# Patient Record
Sex: FEMALE | Race: WHITE | NOT HISPANIC OR LATINO | URBAN - METROPOLITAN AREA
[De-identification: names, ages, dates, MRNs, and addresses within clinical notes are randomized per-mention and may not be internally consistent; named-entity substitution may affect disease eponyms.]

---

## 2017-01-31 DIAGNOSIS — J43.2 CENTRILOBULAR EMPHYSEMA (H): Primary | ICD-10-CM

## 2017-07-19 ENCOUNTER — OFFICE VISIT (OUTPATIENT)
Dept: SLEEP MEDICINE | Facility: CLINIC | Age: 52
End: 2017-07-19
Attending: INTERNAL MEDICINE
Payer: COMMERCIAL

## 2017-07-19 VITALS
HEART RATE: 88 BPM | BODY MASS INDEX: 31.98 KG/M2 | SYSTOLIC BLOOD PRESSURE: 141 MMHG | RESPIRATION RATE: 16 BRPM | WEIGHT: 199 LBS | DIASTOLIC BLOOD PRESSURE: 83 MMHG | HEIGHT: 66 IN | OXYGEN SATURATION: 97 %

## 2017-07-19 DIAGNOSIS — G47.33 OSA (OBSTRUCTIVE SLEEP APNEA): ICD-10-CM

## 2017-07-19 PROCEDURE — 99211 OFF/OP EST MAY X REQ PHY/QHP: CPT | Mod: ZF

## 2017-07-19 NOTE — PATIENT INSTRUCTIONS
Please get us a copy of sleep study for our records.    1.  Continue CPAP every night for all hours that you are sleeping.  If you nap use CPAP.  As always, try to get at least 8 hours of sleep or more each day, and avoid sleep deprivation. Avoid alcohol.    2.  Reasons that you might need a change to your pressure therapy would be weight gain or loss, waking having inadvertently removed your CPAP overnight, having previously felt refreshed by sleep with CPAP use and now waking un-refreshed, and return of daytime sleepiness. Also, the development of new medical problems can sometimes affect breathing at night-heart failure, stroke, medications such as narcotics.    3.  Please bring CPAP with you if you are hospitalized.  If anticipating surgery be sure to discuss with your surgeon that you have sleep apnea and use PAP therapy.      4.  Maintain your equipment as recommended which includes routine cleaning and replacement of supplies.  Call DME for any questions regarding supplies or maintenance.      5.  Do not drive on engage in potentially dangerous activities if feeling sleepy.    6.  Please see Tia Vallejo NP again in 24 months and bring your machine and card to your follow-up visit.        Tips for your CPAP and BIPAP use-    Mask fitting tips  Mask fitting exercise:    To improve your mask seal and your mobility at night, put mask on and secure in place.  Lie down in bed with full pressure and roll to one side, adjust headgear while in that position to eliminate any leaks. Repeat process rolling to other side.     The mask seal does not have to be perfect:   CPAP machines are designed to make up for small leaks. However, you will not tolerate leaks blowing in your eyes so you will need to adjust.   Any leak should only be near or at the bottom of the mask.  We expect your mask to leak slightly at night.    Do not over-tighten the headgear straps, tighter IS NOT better, we expect minimal leak.    First try  re-positioning the mask or headgear before tightening the headgear straps.  Mask leaks are expected due to changing sleeping positions. Try pulling the mask away from your skin allowing the cushion to re-inflate will minimize the leak.  If you struggle for a good fit, try turning the CPAP off and then readjust the mask by pulling it away from your face and then turning back on the CPAP.        Humidifier tips  Humidifiers can be adjusted to increase or decrease the amount of moisture according to your comfort level. You may need to adjust this frequently at first, but then might only change it with seasonal weather changes.     Try INCREASING the humidity if:  You experience a dry, irritated nasal passage or throat.  You have a runny, drippy nose or sneezing fits after using CPAP.  You experience nasal congestion during or after CPAP use.    Try DECREASING the humidity if:  You have excessive condensation or  rain out  in the tubing or mask.  Otherwise keep the tubing warm during the night by running it underneath the blankets or pillow.      Clinic visit after initial CPAP and BIPAP set-up   Bring your equipment with you to your 4 week follow up clinic visit.  We will be extracting your data from the machine.        Travel  Always take your equipment with you.  If you fly with your equipment bring it on with you as a carry on.  Medical equipment does not count as a carry on.    If you travel international the machines take 110-240.  The only adapter needed is the adapter that will fit into the receptacle (outlet).    You may also want to bring an extension cord as many hotel rooms have limited outlets at the bedside.  Do not travel with water in your humidifier chamber.     Cleaning and Maintenance Guidelines    Equipment Frequency Cleaning Method   Mask First Day    Daily      Weekly Soak mask in hot soapy water for 30 minutes, rinse and air dry.  Wipe nasal cushion with a hot soapy (Ivory, baby shampoo) cloth and  rinse.  Baby wipes may also be used.  Do not use anti-bacterial soaps,Delma  liquid soap, rubbing alcohol, bleach or ammonia.  Wash frame in hot soapy water (Ivory, baby shampoo) rinse and let air dry   Headgear Biweekly Wash in hot soapy water, rinse and air dry   Reusable Gray Filter Weekly Wash in hot soapy water, rinse, put in towel squeeze moisture out, let air dry   Disposable White Filter Check Weekly Replace when brown or gray in color; at least every 2 to 3 months   Humidifier Chamber Daily    Weekly Empty distilled water from humidifier and let air dry    Hand wash in hot soapy water, rinse and air dry   Tubing Weekly Wash in hot soapy water, rinse and let air dry   Mask, Tubing and Humidifier Chamber As needed Disinfect: Soak in 1 part vinegar to 3 parts hot water for 30 minutes, rinse well and air dry  Not the material headgear        MASK AND SUPPLY REORDERING  Reminder: Most insurance companies will allow for a new mask, headgear, tubing, and reusable gray filter every six months.  Disposable white ultra-fine filters are covered monthly.    EQUIPMENT NEEDS  Please call our CPAP specialist with any equipment problems, questions or needs.    HOME AND SAFETY INSTRUCTIONS    Do not use frayed or cracked electrical cords, multi plug adaptors, or switched receptacles    Do not immerse electrical equipment into water    Assure that electrical cords do not become a tripping hazard      Your BMI is Body mass index is 32.12 kg/(m^2).  Weight management is a personal decision.  If you are interested in exploring weight loss strategies, the following discussion covers the approaches that may be successful. Body mass index (BMI) is one way to tell whether you are at a healthy weight, overweight, or obese. It measures your weight in relation to your height.  A BMI of 18.5 to 24.9 is in the healthy range. A person with a BMI of 25 to 29.9 is considered overweight, and someone with a BMI of 30 or greater is considered  obese. More than two-thirds of American adults are considered overweight or obese.  Being overweight or obese increases the risk for further weight gain. Excess weight may lead to heart disease and diabetes.  Creating and following plans for healthy eating and physical activity may help you improve your health.  Weight control is part of healthy lifestyle and includes exercise, emotional health, and healthy eating habits. Careful eating habits lifelong are the mainstay of weight control. Though there are significant health benefits from weight loss, long-term weight loss with diet alone may be very difficult to achieve- studies show long-term success with dietary management in less than 10% of people. Attaining a healthy weight may be especially difficult to achieve in those with severe obesity. In some cases, medications, devices and surgical management might be considered.  What can you do?  If you are overweight or obese and are interested in methods for weight loss, you should discuss this with your provider.     Consider reducing daily calorie intake by 500 calories.     Keep a food journal.     Avoiding skipping meals, consider cutting portions instead.    Diet combined with exercise helps maintain muscle while optimizing fat loss. Strength training is particularly important for building and maintaining muscle mass. Exercise helps reduce stress, increase energy, and improves fitness. Increasing exercise without diet control, however, may not burn enough calories to loose weight.       Start walking three days a week 10-20 minutes at a time    Work towards walking thirty minutes five days a week     Eventually, increase the speed of your walking for 1-2 minutes at time    In addition, we recommend that you review healthy lifestyles and methods for weight loss available through the National Institutes of Health patient information sites:  http://win.niddk.nih.gov/publications/index.htm    And look into health  and wellness programs that may be available through your health insurance provider, employer, local community center, or [employer].    Weight management plan: Patient was referred to their PCP to discuss a diet and exercise plan.     Your blood pressure was checked while you were in clinic today.  Please read the guidelines below about what these numbers mean and what you should do about them.  Your systolic blood pressure is the top number.  This is the pressure when the heart is pumping.  Your diastolic blood pressure is the bottom number.  This is the pressure in between beats.  If your systolic blood pressure is less than 120 and your diastolic blood pressure is less than 80, then your blood pressure is normal. There is nothing more that you need to do about it  If your systolic blood pressure is 120-139 or your diastolic blood pressure is 80-89, your blood pressure may be higher than it should be.  You should have your blood pressure re-checked within a year by a primary care provider.  If your systolic blood pressure is 140 or greater or your diastolic blood pressure is 90 or greater, you may have high blood pressure.  High blood pressure is treatable, but if left untreated over time it can put you at risk for heart attack, stroke, or kidney failure.  You should have your blood pressure re-checked by a primary care provider within the next four weeks.

## 2017-07-19 NOTE — PROGRESS NOTES
Sleep Follow-Up Visit:    Date on this visit: 7/19/2017    Rosey Davalos is a 51 y/o female with past medical history including COPD, recent diagnosis of DM II, ERNIE on CPAP, GERD, obesity.  She comes in today for follow-up because she wanted to go over her CPAP download and check in regarding her ERNIE. We did not have a copy of her PSG at the time of interview, and requested that she provide a copy of her PSG for our records.  Ms. Davalos reported that she uses her CPAP regularly and overall has no sleep complaints.  She reported  that she sleeps well, and has no difficulty falling or staying asleep.  She does not notice snoring or gasping for air while using her CPAP.  She reported that she does take melatonin 2mg po nightly.  Ms. Davalos reported that she has had an approximately 50 lb weight gain over the past few years.  She denied being aware of the need to move her legs at night, but said her  has told her she moves her legs in her sleep at night.  She said she is followed by a psychiatrist and treated for ADHD with Adderall; PTSD with Venlefaxine and xanax (taking this twice/month).  She indicated she has obtains her CPAP supplies through [employer], but was not sure who had been ordering her supplies.  We discussed the need for our clinic to have a copy of her PSG if we were to write orders for her CPAP or supplies.    PAP download was reviewed:  Used the device 30/30days  More than 4 hours of use: 100%  Average daily use on the days used was 8.8 hours  Pressure 13.6cm H2O (95th percentile)  Residual AHI 2.5  Leak 10.0 L/min (95th percentile)    PSG from 8/2013 received:  Her weight at the time of the study was 182 lbs.  It appeared to be a good quality study where she slept 404.5 minutes and all stages of sleep were represented.  AHI 9.2, RERA index 25.7, RDI 34.9, PLM  with arousal: 0, PLM without arousal 203.    Problem List:  Patient Active Problem List    Diagnosis Date Noted     Obesity 07/09/2014  "    Priority: Medium     Menorrhagia 11/20/2013     Priority: Medium     History of tubal ligation 11/20/2013     Priority: Medium     History of cholecystectomy 11/20/2013     Priority: Medium     Iron deficiency 11/20/2013     Priority: Medium     ERNIE (obstructive sleep apnea) 11/20/2013     Priority: Medium     Auto CPAP since 08/2013  Moderate to severe  Controlled with CPAP - followed by sleep  Nasal mask       Obstructive lung disease (H) 11/20/2013     Priority: Medium     Moderate obstruction       Esophageal reflux 11/20/2013     Priority: Medium     Vitamin D deficiency 11/20/2013     Priority: Medium     Chronic fatigue 11/20/2013     Priority: Medium     Likely multi-factorial       Depression 11/20/2013     Priority: Medium     Pulmonary nodules 11/20/2013     Priority: Medium     Several small sub-4 mm nodules scattered throughout the lungs,  representative lesions include  2 mm right lower lobe  2 mm calcified right upper lobe  2 mm calcified left upper lobe     CT needed 11/2014            Eppworth score:  2    Physical Exam:  /83  Pulse 88  Resp 16  Ht 1.676 m (5' 6\")  Wt 90.3 kg (199 lb)  SpO2 97%  BMI 32.12 kg/m2    General: No apparent distress, appropriately groomed, calm  Head: Normocephalic, atraumatic, Eyes: no icterus, PERRL  Nose: nares patent, normal turbinates.  Mouth: Mallampati I  Cardiac: Regular rate and rhythm  Chest: Symmetric air movement, lungs clear to auscultation bilaterally, no coughing noted during interview  Musculoskeletal: no edema noted  Skin: Warm, dry, intact  Psych: Mood pleasant, affect congruent, alert, did not appear to be tired.      Impression/Plan:    Obstructive Sleep Apnea, (Mild by AHI on 2013 PSG but moderate considering RDI of 34.9) well controlled on CPAP.    Periodic Limb movements during sleep    Today's CPAP machine data shows 100% compliance. ERNIE is well controlled with a residual AHI 2.5 events per hour. No need to make any pressures " adjustments at this time. Recommend to continue good compliance. Continue to get the equipment replaced on a regular basis and maintain machine as recommended.      Periodic limb movements:  Her PSG from 2013 demonstrated significant periodic limb movements without arousal.  On interview she reported that her  noted limb movements, but she has no awareness of concern about her leg movements.  Would recommend continuing to monitor this in case it becomes problematic.    PSG from 2013 received and will be scanned into chart.    Encourage weight loss, healthy diet, and exercise.    Patient was strongly advised to avoid driving while drowsy or sleepy.  Patient was counseled on the importance of driving while alert, to pull over if drowsy, or nap before getting into the vehicle if sleepy.      Follow up Tia Jeffery NP in 2 years and prn  ?  Seen and examined with Dr. Lee  ?    Tay Wells MD  Clinical Sleep Medicine Fellow  Pager #[phone]      Attending: Patient seen and examined and personally counseled.  Todays CPAP download reviewed, PSG results personally reviewed from 2013. This note reflects our mutual assessment and plan.  Mona Farmer MD  Pulmonary, Critical Care, and Sleep Medicine

## 2017-07-19 NOTE — NURSING NOTE
"Chief Complaint   Patient presents with     CPAP Follow Up       Initial /83  Pulse 88  Resp 16  Ht 1.676 m (5' 6\")  Wt 90.3 kg (199 lb)  SpO2 97%  BMI 32.12 kg/m2 Estimated body mass index is 32.12 kg/(m^2) as calculated from the following:    Height as of this encounter: 1.676 m (5' 6\").    Weight as of this encounter: 90.3 kg (199 lb).  Medication Reconciliation: complete     GASTON Pelayo        "

## 2017-07-19 NOTE — MR AVS SNAPSHOT
After Visit Summary   7/19/2017    Rosey Davalos    MRN: [id number]           Patient Information     Date Of Birth          1965        Visit Information        Provider Department      7/19/2017 9:20 AM Tay Wells MD [facility], [place], Sleep Study        Today's Diagnoses     ERNIE (obstructive sleep apnea)          Care Instructions    Please get us a copy of sleep study for our records.    1.  Continue CPAP every night for all hours that you are sleeping.  If you nap use CPAP.  As always, try to get at least 8 hours of sleep or more each day, and avoid sleep deprivation. Avoid alcohol.    2.  Reasons that you might need a change to your pressure therapy would be weight gain or loss, waking having inadvertently removed your CPAP overnight, having previously felt refreshed by sleep with CPAP use and now waking un-refreshed, and return of daytime sleepiness. Also, the development of new medical problems can sometimes affect breathing at night-heart failure, stroke, medications such as narcotics.    3.  Please bring CPAP with you if you are hospitalized.  If anticipating surgery be sure to discuss with your surgeon that you have sleep apnea and use PAP therapy.      4.  Maintain your equipment as recommended which includes routine cleaning and replacement of supplies.  Call DME for any questions regarding supplies or maintenance.      5.  Do not drive on engage in potentially dangerous activities if feeling sleepy.    6.  Please see Tia Vallejo NP again in 24 months and bring your machine and card to your follow-up visit.        Tips for your CPAP and BIPAP use-    Mask fitting tips  Mask fitting exercise:    To improve your mask seal and your mobility at night, put mask on and secure in place.  Lie down in bed with full pressure and roll to one side, adjust headgear while in that position to eliminate any leaks. Repeat process rolling to other side.     The mask seal does not have to be  perfect:   CPAP machines are designed to make up for small leaks. However, you will not tolerate leaks blowing in your eyes so you will need to adjust.   Any leak should only be near or at the bottom of the mask.  We expect your mask to leak slightly at night.    Do not over-tighten the headgear straps, tighter IS NOT better, we expect minimal leak.    First try re-positioning the mask or headgear before tightening the headgear straps.  Mask leaks are expected due to changing sleeping positions. Try pulling the mask away from your skin allowing the cushion to re-inflate will minimize the leak.  If you struggle for a good fit, try turning the CPAP off and then readjust the mask by pulling it away from your face and then turning back on the CPAP.        Humidifier tips  Humidifiers can be adjusted to increase or decrease the amount of moisture according to your comfort level. You may need to adjust this frequently at first, but then might only change it with seasonal weather changes.     Try INCREASING the humidity if:  You experience a dry, irritated nasal passage or throat.  You have a runny, drippy nose or sneezing fits after using CPAP.  You experience nasal congestion during or after CPAP use.    Try DECREASING the humidity if:  You have excessive condensation or  rain out  in the tubing or mask.  Otherwise keep the tubing warm during the night by running it underneath the blankets or pillow.      Clinic visit after initial CPAP and BIPAP set-up   Bring your equipment with you to your 4 week follow up clinic visit.  We will be extracting your data from the machine.        Travel  Always take your equipment with you.  If you fly with your equipment bring it on with you as a carry on.  Medical equipment does not count as a carry on.    If you travel international the machines take 110-240.  The only adapter needed is the adapter that will fit into the receptacle (outlet).    You may also want to bring an extension  cord as many hotel rooms have limited outlets at the bedside.  Do not travel with water in your humidifier chamber.     Cleaning and Maintenance Guidelines    Equipment Frequency Cleaning Method   Mask First Day    Daily      Weekly Soak mask in hot soapy water for 30 minutes, rinse and air dry.  Wipe nasal cushion with a hot soapy (Ivory, baby shampoo) cloth and rinse.  Baby wipes may also be used.  Do not use anti-bacterial soaps,Delma  liquid soap, rubbing alcohol, bleach or ammonia.  Wash frame in hot soapy water (Ivory, baby shampoo) rinse and let air dry   Headgear Biweekly Wash in hot soapy water, rinse and air dry   Reusable Gray Filter Weekly Wash in hot soapy water, rinse, put in towel squeeze moisture out, let air dry   Disposable White Filter Check Weekly Replace when brown or gray in color; at least every 2 to 3 months   Humidifier Chamber Daily    Weekly Empty distilled water from humidifier and let air dry    Hand wash in hot soapy water, rinse and air dry   Tubing Weekly Wash in hot soapy water, rinse and let air dry   Mask, Tubing and Humidifier Chamber As needed Disinfect: Soak in 1 part vinegar to 3 parts hot water for 30 minutes, rinse well and air dry  Not the material headgear        MASK AND SUPPLY REORDERING  Reminder: Most insurance companies will allow for a new mask, headgear, tubing, and reusable gray filter every six months.  Disposable white ultra-fine filters are covered monthly.    EQUIPMENT NEEDS  Please call our CPAP specialist with any equipment problems, questions or needs.    HOME AND SAFETY INSTRUCTIONS    Do not use frayed or cracked electrical cords, multi plug adaptors, or switched receptacles    Do not immerse electrical equipment into water    Assure that electrical cords do not become a tripping hazard      Your BMI is Body mass index is 32.12 kg/(m^2).  Weight management is a personal decision.  If you are interested in exploring weight loss strategies, the following  discussion covers the approaches that may be successful. Body mass index (BMI) is one way to tell whether you are at a healthy weight, overweight, or obese. It measures your weight in relation to your height.  A BMI of 18.5 to 24.9 is in the healthy range. A person with a BMI of 25 to 29.9 is considered overweight, and someone with a BMI of 30 or greater is considered obese. More than two-thirds of American adults are considered overweight or obese.  Being overweight or obese increases the risk for further weight gain. Excess weight may lead to heart disease and diabetes.  Creating and following plans for healthy eating and physical activity may help you improve your health.  Weight control is part of healthy lifestyle and includes exercise, emotional health, and healthy eating habits. Careful eating habits lifelong are the mainstay of weight control. Though there are significant health benefits from weight loss, long-term weight loss with diet alone may be very difficult to achieve- studies show long-term success with dietary management in less than 10% of people. Attaining a healthy weight may be especially difficult to achieve in those with severe obesity. In some cases, medications, devices and surgical management might be considered.  What can you do?  If you are overweight or obese and are interested in methods for weight loss, you should discuss this with your provider.     Consider reducing daily calorie intake by 500 calories.     Keep a food journal.     Avoiding skipping meals, consider cutting portions instead.    Diet combined with exercise helps maintain muscle while optimizing fat loss. Strength training is particularly important for building and maintaining muscle mass. Exercise helps reduce stress, increase energy, and improves fitness. Increasing exercise without diet control, however, may not burn enough calories to loose weight.       Start walking three days a week 10-20 minutes at a  time    Work towards walking thirty minutes five days a week     Eventually, increase the speed of your walking for 1-2 minutes at time    In addition, we recommend that you review healthy lifestyles and methods for weight loss available through the National Institutes of Health patient information sites:  http://win.niddk.nih.gov/publications/index.htm    And look into health and wellness programs that may be available through your health insurance provider, employer, local community center, or [employer] club.    Weight management plan: Patient was referred to their PCP to discuss a diet and exercise plan.     Your blood pressure was checked while you were in clinic today.  Please read the guidelines below about what these numbers mean and what you should do about them.  Your systolic blood pressure is the top number.  This is the pressure when the heart is pumping.  Your diastolic blood pressure is the bottom number.  This is the pressure in between beats.  If your systolic blood pressure is less than 120 and your diastolic blood pressure is less than 80, then your blood pressure is normal. There is nothing more that you need to do about it  If your systolic blood pressure is 120-139 or your diastolic blood pressure is 80-89, your blood pressure may be higher than it should be.  You should have your blood pressure re-checked within a year by a primary care provider.  If your systolic blood pressure is 140 or greater or your diastolic blood pressure is 90 or greater, you may have high blood pressure.  High blood pressure is treatable, but if left untreated over time it can put you at risk for heart attack, stroke, or kidney failure.  You should have your blood pressure re-checked by a primary care provider within the next four weeks.                Follow-ups after your visit        Follow-up notes from your care team     Return in about 2 years (around 7/19/2019).      Your next 10 appointments already scheduled      "Oct 16, 2017  9:30 AM CDT   PFT VISIT with  PFL B   [facility] ([facility])    [address] [address] [phone]            Oct 16, 2017 10:00 AM CDT   (Arrive by 9:45 AM)   Return Visit with Evelyn Lazo MD   [facility] ([facility]    [address] [address] [phone]              Who to contact     If you have questions or need follow up information about today's clinic visit or your schedule please contact [facility] directly at [phone].  Normal or non-critical lab and imaging results will be communicated to you by MyChart, letter or phone within 4 business days after the clinic has received the results. If you do not hear from us within 7 days, please contact the clinic through MyChart or phone. If you have a critical or abnormal lab result, we will notify you by phone as soon as possible.  Submit refill requests through [employer] or call your pharmacy and they will forward the refill request to us. Please allow 3 business days for your refill to be completed.          Additional Information About Your Visit        [employer] Information     [employer] gives you secure access to your electronic health record. If you see a primary care provider, you can also send messages to your care team and make appointments. If you have questions, please call your primary care clinic.  If you do not have a primary care provider, please call [phone] and they will assist you.        Care EveryWhere ID     This is your Care EveryWhere ID. This could be used by other organizations to access your [facility] medical records  [id number]        Your Vitals Were     Pulse Respirations Height Pulse Oximetry BMI (Body Mass Index)       88 16 1.676 m (5' 6\") 97% 32.12 kg/m2        Blood Pressure from Last 3 " Encounters:   07/19/17 141/83   05/25/16 131/88   01/27/16 133/89    Weight from Last 3 Encounters:   07/19/17 90.3 kg (199 lb)   01/27/16 96.6 kg (213 lb)   05/27/15 79 kg (174 lb 1.6 oz)              Today, you had the following     No orders found for display       Primary Care Provider Office Phone # Fax #    [facility] [phone]       [address] [address]        Equal Access to Services     [facility]: Hadii aad ku hadasho Soomaali, waaxda luqadaha, qaybta kaalmada adeegyada, candace sheikh . So [facility] [phone].    ATENCIÓN: Si habla español, tiene a brandt disposición servicios gratuitos de asistencia lingüística. [facility] [phone].    We comply with applicable federal civil rights laws and [place] laws. We do not discriminate on the basis of race, color, national origin, age, disability sex, sexual orientation or gender identity.            Thank you!     Thank you for choosing [facility]  for your care. Our goal is always to provide you with excellent care. Hearing back from our patients is one way we can continue to improve our services. Please take a few minutes to complete the written survey that you may receive in the mail after your visit with us. Thank you!             Your Updated Medication List - Protect others around you: Learn how to safely use, store and throw away your medicines at www.disposemymeds.org.          This list is accurate as of: 7/19/17  2:24 PM.  Always use your most recent med list.                   Brand Name Dispense Instructions for use Diagnosis    * ADDERALL XR PO      Take 20 mg by mouth        * ADDERALL PO      Take 10 mg by mouth        EFFEXOR PO      Take 150 mg by mouth 3 times daily        Fiber Tabs      Take 2 tablets by mouth daily        fluticasone-salmeterol 250-50 MCG/DOSE diskus inhaler    ADVAIR    1 Inhaler    Inhale 1 puff into the lungs 2 times  daily    Centrilobular emphysema (H)       Iron Tabs      Take 1 tablet by mouth daily        OMEPRAZOLE PO      Take 1 tablet by mouth daily 1 20mg tablet once daily        order for DME      Use your CPAP device as directed by your provider.        UNABLE TO FIND      10 mg MEDICATION NAME: T3 for thyroid        VALTREX 500 MG tablet   Generic drug:  valACYclovir      Take 500 mg by mouth 2 times daily        VENTOLIN IN      Inhale 2 puffs into the lungs 2 times daily        vitamin D3 60549 UNITS capsule    CHOLECALCIFEROL     Take 50,000 Units by mouth every 7 days        XANAX 0.5 MG tablet   Generic drug:  ALPRAZolam      Take 0.5 mg by mouth 2 times daily as needed        ZYRTEC PO      Take 1 tablet by mouth daily        * Notice:  This list has 2 medication(s) that are the same as other medications prescribed for you. Read the directions carefully, and ask your doctor or other care provider to review them with you.

## 2017-08-17 ENCOUNTER — PRE VISIT (OUTPATIENT)
Dept: ORTHOPEDICS | Facility: CLINIC | Age: 52
End: 2017-08-17

## 2017-08-17 NOTE — TELEPHONE ENCOUNTER
1.  Date/reason for appt: 8/30/17 - Rt Foot Pain  2.  Referring provider: self  3.  Call to patient (Yes / No - short description): no, per appt notes -- no outside records or imaging per pt  4.  Previous care at / records requested from: NONE

## 2017-08-30 ENCOUNTER — OFFICE VISIT (OUTPATIENT)
Dept: ORTHOPEDICS | Facility: CLINIC | Age: 52
End: 2017-08-30

## 2017-08-30 VITALS — BODY MASS INDEX: 33.3 KG/M2 | HEIGHT: 66 IN | WEIGHT: 207.2 LBS

## 2017-08-30 DIAGNOSIS — M72.2 PLANTAR FASCIITIS: Primary | ICD-10-CM

## 2017-08-30 DIAGNOSIS — I87.2 VENOUS (PERIPHERAL) INSUFFICIENCY: ICD-10-CM

## 2017-08-30 DIAGNOSIS — M79.671 PAIN OF RIGHT HEEL: ICD-10-CM

## 2017-08-30 RX ORDER — GLIPIZIDE AND METFORMIN HCL 2.5; 5 MG/1; MG/1
1 TABLET, FILM COATED ORAL
COMMUNITY
End: 2020-06-12

## 2017-08-30 ASSESSMENT — ENCOUNTER SYMPTOMS
BLOOD IN STOOL: 0
EXERCISE INTOLERANCE: 0
DIFFICULTY URINATING: 0
JAUNDICE: 0
HYPERTENSION: 0
CLAUDICATION: 0
ARTHRALGIAS: 1
MYALGIAS: 0
HEARTBURN: 0
BACK PAIN: 0
SLEEP DISTURBANCES DUE TO BREATHING: 0
STIFFNESS: 1
DYSURIA: 0
HEMATURIA: 0
ORTHOPNEA: 0
LIGHT-HEADEDNESS: 0
ABDOMINAL PAIN: 0
FLANK PAIN: 0
HYPOTENSION: 0
LEG SWELLING: 1
PALPITATIONS: 0
NAUSEA: 0
VOMITING: 0
SYNCOPE: 0
TACHYCARDIA: 0
BOWEL INCONTINENCE: 0
CONSTIPATION: 1
DIARRHEA: 0
BLOATING: 0
RECTAL PAIN: 0
RECTAL BLEEDING: 0
LEG PAIN: 0
JOINT SWELLING: 0
MUSCLE CRAMPS: 0
NECK PAIN: 0
MUSCLE WEAKNESS: 0

## 2017-08-30 NOTE — PROGRESS NOTES
Date of Service: 8/30/2017    Chief Complaint:   Chief Complaint   Patient presents with     Consult     Right foot pain, bottom of her foot arch to her heel, for a few months, wakes her up at night, can't go to sleep some nights      HPI: Rosey is a 52 year old female who presents today for evaluation of right foot arch and heel pain. This sharp, stabbing pain has been present for about 2-3 months without a known precipitating event. It is located on the inside of her right heel. It has gotten steadily worse to the point where she is now having difficulty falling asleep, staying asleep and will limp in the mornings. Some days are worse than others and she can't think of why. Does not have a daily exercise routine, but it does impact her daily activities (routine walking and tasks). She has tried frozen water bottle and stretches with minimal relief. Is hesitant to use NSAIDs for pain because at one time her PCP told her to avoid them if possible. She was recently diagnosed with DM type 2 which is controlled with metformin. Had left bunion repair in the past that was successful. Admits intermittent pitting edema. Denies foot pain elsewhere, burning, tingling, numbness, skin lesions, bruising, rashes.    Review of Systems: Answers for HPI/ROS submitted by the patient on 8/30/2017   General Symptoms: No  Skin Symptoms: No  HENT Symptoms: No  EYE SYMPTOMS: No  HEART SYMPTOMS: Yes  LUNG SYMPTOMS: No  INTESTINAL SYMPTOMS: Yes  URINARY SYMPTOMS: Yes  GYNECOLOGIC SYMPTOMS: No  BREAST SYMPTOMS: No  SKELETAL SYMPTOMS: Yes  BLOOD SYMPTOMS: No  NERVOUS SYSTEM SYMPTOMS: No  MENTAL HEALTH SYMPTOMS: No  Chest pain or pressure: No  Fast or irregular heartbeat: No  Pain in legs with walking: No  Swelling in feet or ankles: Yes  Trouble breathing while lying down: No  Fingers or Toes appear blue: No  High blood pressure: No  Low blood pressure: No  Fainting: No  Murmurs: No  Chest pain on exertion: No  Chest pain at rest:  No  Cramping pain in leg during exercise: No  Pacemaker: No  Varicose veins: No  Edema or swelling: Yes  Fast heart beat: No  Wake up at night with shortness of breath: No  Heart flutters: No  Light-headedness: No  Exercise intolerance: No  Heart burn or indigestion: No  Nausea: No  Vomiting: No  Abdominal pain: No  Bloating: No  Constipation: Yes  Diarrhea: No  Blood in stool: No  Black stools: No  Rectal or Anal pain: No  Fecal incontinence: No  Rectal bleeding: No  Yellowing of skin or eyes: No  Vomit with blood: No  Change in stools: No  Hemorrhoids: Yes  Trouble holding urine or incontinence: No  Pain or burning: No  Trouble starting or stopping: Yes  Increased frequency of urination: No  Blood in urine: No  Decreased frequency of urination: No  Frequent nighttime urination: No  Flank pain: No  Difficulty emptying bladder: No  Back pain: No  Muscle aches: No  Neck pain: No  Swollen joints: No  Joint pain: Yes  Bone pain: No  Muscle cramps: No  Muscle weakness: No  Joint stiffness: Yes  Bone fracture: No    PMH:   Past Medical History:   Diagnosis Date     COPD (chronic obstructive pulmonary disease) (H)     on ICS/LABA. A1AT enma normal.     Depression      ERNIE on CPAP        PSxH: No past surgical history on file.    Allergies: Wellbutrin [bupropion hydrobromide]    SH:   Social History     Social History     Marital status:      Spouse name: N/A     Number of children: N/A     Years of education: N/A     Occupational History     Not on file.     Social History Main Topics     Smoking status: Former Smoker     Packs/day: 1.50     Years: 20.00     Types: Cigarettes     Quit date: 11/20/2004     Smokeless tobacco: Not on file     Alcohol use Not on file     Drug use: Not on file     Sexual activity: Not on file     Other Topics Concern     Not on file     Social History Narrative    The patient has a 20-25 pk yr tobacco hx.  She has no active use and quit in 2004.  Alcohol use is <1 alcoholic drinks per  "week.  She denies use of recreational drugs.          She is employed as a .          The patient is .  Has 3 children. (raising 2)        Hot Tube Exposure: NO    Recent Travel: [place] 09/2013 returned Oct 2013     Hx of incarceration:  NO    Bird Exposure:   NO    Animal Exposure:  Dogs    Inhalation Exposure:  NO       FH:   Family History   Problem Relation Age of Onset     Depression Mother      Alcohol/Drug Mother      Alcohol/Drug Father      Alcohol/Drug Sister      Depression Father      Depression Sister      HEART DISEASE Paternal Grandfather      CANCER Paternal Grandmother      Stomach cancer     DIABETES Paternal Aunt        Objective:  Ht 1.676 m (5' 6\")  Wt 94 kg (207 lb 3.2 oz)  BMI 33.44 kg/m2    PT and DP pulses are 2/4 bilaterally. CRT is <3 seconds. Normal pedal hair. Mild non-pitting edema.  Gross sensation is intact bilaterally.   Equinus mild to right foot bilaterally. No pain with active or passive ROM of the ankle, MTJ, 1st ray, or halluces bilaterally. Moderate tenderness to palpation of the medial insertion of plantar fascia of right foot.  Nails normal bilaterally. No open lesions are noted. Skin slightly dry.     Assessment:   - Plantar fasciitis of the right foot  - DM type 2, controlled  - Peripheral venous insufficiency    Plan:  - Pt seen and evaluated. Diagnosis and treatment options discussed.  - Continue plantar fasciitis stretches, ice, massage, NSAIDs, PowerStep shoe insoles, supportive footwear  - Dispensed night splint.   - Discussed steroid injection into plantar fascia, patient would like to proceed with this today. Procedure, potential risks, potential benefits, potential outcomes discussed with patient. Consent signed Foot was prepped with chloro prep after a timeout. Consent signed. 1 CCs of kenalog 40, 1 CC dexamethasone and 1 CC lidocaine was injected into medial insertion of the plantar fascia of right foot. Patient tolerated procedure well with " no complications.  - Ordered 15-20 mmHg compression stockings, gave rx to patient.  - RTC 1 month

## 2017-08-30 NOTE — NURSING NOTE
[facility]  [address] [place] [id number]  Dept: [phone]  ______________________________________________________________________________    Patient: Rosey Davalos   : 1965   MRN: [id number]   2017    INVASIVE PROCEDURE SAFETY CHECKLIST    Date: 2017   Procedure:Right foot steroid injection  Patient Name: Rosey Davalos  MRN: [id number]  YOB: 1965    Action: Complete sections as appropriate. Any discrepancy results in a HARD COPY until resolved.     PRE PROCEDURE:  Patient ID verified with 2 identifiers (name and  or MRN): Yes  Procedure and site verified with patient/designee (when able): Yes  Accurate consent documentation in medical record: Yes  H&P (or appropriate assessment) documented in medical record: Yes  H&P must be up to 20 days prior to procedure and updates within 24 hours of procedure as applicable: Yes  Relevant diagnostic and radiology test results appropriately labeled and displayed as applicable: Yes  Procedure site(s) marked with provider initials: Yes    TIMEOUT:  Time-Out performed immediately prior to starting procedure, including verbal and active participation of all team members addressing the following:Yes  * Correct patient identify  * Confirmed that the correct side and site are marked  * An accurate procedure consent form  * Agreement on the procedure to be done  * Correct patient position  * Relevant images and results are properly labeled and appropriately displayed  * The need to administer antibiotics or fluids for irrigation purposes during the procedure as applicable   * Safety precautions based on patient history or medication use    DURING PROCEDURE: Verification of correct person, site, and procedures any time the responsibility for care of the patient is transferred to another member of the care team.     Teaching Flowsheet   Relevant Diagnosis: Right foot steroid injection  Teaching  Topic: steroid injection     Person(s) involved in teaching:   Patient     Motivation Level:  Asks Questions: Yes  Eager to Learn: Yes  Cooperative: Yes  Receptive (willing/able to accept information): Yes  Any cultural factors/Yarsani beliefs that may influence understanding or compliance? No       Patient demonstrates understanding of the following:  Reason for the appointment, diagnosis and treatment plan: Yes  Knowledge of proper use of medications and conditions for which they are ordered (with special attention to potential side effects or drug interactions): Yes  Which situations necessitate calling provider and whom to contact: Yes       Teaching Concerns Addressed:        Proper use and care of  (medical equip, care aids, etc.): NA  Nutritional needs and diet plan: NA  Pain management techniques: NA  Wound Care: NA  How and/when to access community resources: NA     Instructional Materials Used/Given: verbal     The following medication was given:     MEDICATION:  Kenalog 40 mg  ROUTE: antr-articular  SITE: Right foot  DOSE: 40 mg/1 ml  LOT #: [id number]  : [employer]  EXPIRATION DATE: 12/2018  NDC#: 1923-5368-59   Was there drug waste? No    MEDICATION:  Dexamethasone  ROUTE: intra-articular  SITE: Right foot  DOSE: 4 mg/1 ml  LOT #: [id number]  : [employer]  EXPIRATION DATE: 08/2018  NDC#: 96082-975-44   Was there drug waste? No    MEDICATION:  Lidocaine without epinephrine  ROUTE: intra-articular  SITE: Right foot  DOSE: 1ml  LOT #: [id number]  : [employer]  EXPIRATION DATE: 03/2021  NDC#: 59474-092-38   Was there drug waste? Yes  Amount of drug waste (mL): 19 ml.  Reason for waste:  Single use vial      Hamlet Alejandro LPN  August 30, 2017

## 2017-08-30 NOTE — LETTER
8/30/2017       RE: Rosey Davalos  [address]  [facility] [place] [address]     Dear Colleague,    Thank you for referring your patient, Rosey Davalos, to the [facility] at [facility]. Please see a copy of my visit note below.    Date of Service: 8/30/2017    Chief Complaint:   Chief Complaint   Patient presents with     Consult     Right foot pain, bottom of her foot arch to her heel, for a few months, wakes her up at night, can't go to sleep some nights      HPI: Rosey is a 52 year old female who presents today for evaluation of right foot arch and heel pain. This sharp, stabbing pain has been present for about 2-3 months without a known precipitating event. It is located on the inside of her right heel. It has gotten steadily worse to the point where she is now having difficulty falling asleep, staying asleep and will limp in the mornings. Some days are worse than others and she can't think of why. Does not have a daily exercise routine, but it does impact her daily activities (routine walking and tasks). She has tried frozen water bottle and stretches with minimal relief. Is hesitant to use NSAIDs for pain because at one time her PCP told her to avoid them if possible. She was recently diagnosed with DM type 2 which is controlled with metformin. Had left bunion repair in the past that was successful. Admits intermittent pitting edema. Denies foot pain elsewhere, burning, tingling, numbness, skin lesions, bruising, rashes.    Review of Systems: Answers for HPI/ROS submitted by the patient on 8/30/2017     PMH:   Past Medical History:   Diagnosis Date     COPD (chronic obstructive pulmonary disease) (H)     on ICS/LABA. A1AT enma normal.     Depression      ERNIE on CPAP        PSxH: No past surgical history on file.    Allergies: Wellbutrin [bupropion hydrobromide]    SH:   Social History     Social History     Marital status:      Spouse name: N/A  "    Number of children: N/A     Years of education: N/A     Occupational History     Not on file.     Social History Main Topics     Smoking status: Former Smoker     Packs/day: 1.50     Years: 20.00     Types: Cigarettes     Quit date: 11/20/2004     Smokeless tobacco: Not on file     Alcohol use Not on file     Drug use: Not on file     Sexual activity: Not on file     Other Topics Concern     Not on file     Social History Narrative    The patient has a 20-25 pk yr tobacco hx.  She has no active use and quit in 2004.  Alcohol use is <1 alcoholic drinks per week.  She denies use of recreational drugs.          She is employed as a .          The patient is .  Has 3 children. (raising 2)        Hot Tube Exposure: NO    Recent Travel: [place] 09/2013 returned Oct 2013     Hx of incarceration:  NO    Bird Exposure:   NO    Animal Exposure:  Dogs    Inhalation Exposure:  NO       FH:   Family History   Problem Relation Age of Onset     Depression Mother      Alcohol/Drug Mother      Alcohol/Drug Father      Alcohol/Drug Sister      Depression Father      Depression Sister      HEART DISEASE Paternal Grandfather      CANCER Paternal Grandmother      Stomach cancer     DIABETES Paternal Aunt        Objective:  Ht 1.676 m (5' 6\")  Wt 94 kg (207 lb 3.2 oz)  BMI 33.44 kg/m2    PT and DP pulses are 2/4 bilaterally. CRT is <3 seconds. Normal pedal hair. Mild non-pitting edema.  Gross sensation is intact bilaterally.   Equinus mild to right foot bilaterally. No pain with active or passive ROM of the ankle, MTJ, 1st ray, or halluces bilaterally. Moderate tenderness to palpation of the medial insertion of plantar fascia of right foot.  Nails normal bilaterally. No open lesions are noted. Skin slightly dry.     Assessment:   - Plantar fasciitis of the right foot  - DM type 2, controlled  - Peripheral venous insufficiency    Plan:  - Pt seen and evaluated. Diagnosis and treatment options discussed.  - " Continue plantar fasciitis stretches, ice, massage, NSAIDs, PowerStep shoe insoles, supportive footwear  - Dispensed night splint.   - Discussed steroid injection into plantar fascia, patient would like to proceed with this today. Procedure, potential risks, potential benefits, potential outcomes discussed with patient. Consent signed Foot was prepped with chloro prep after a timeout. Consent signed. 1 CCs of kenalog 40, 1 CC dexamethasone and 1 CC lidocaine was injected into medial insertion of the plantar fascia of right foot. Patient tolerated procedure well with no complications.  - Ordered 15-20 mmHg compression stockings, gave rx to patient.  - RTC 1 month    Again, thank you for allowing me to participate in the care of your patient.      Sincerely,    Paul Bruce DPM

## 2017-08-30 NOTE — MR AVS SNAPSHOT
After Visit Summary   8/30/2017    Rosey Davalos    MRN: [id number]           Patient Information     Date Of Birth          1965        Visit Information        Provider Department      8/30/2017 7:40 AM Paul Bruce DPM [facility]        Today's Diagnoses     Plantar fasciitis    -  1    Venous (peripheral) insufficiency          Care Instructions    Purchase PowerStep insoles, pinnacle model, on [employer] or [employer]       Plantar Fasciitis  Plantar fasciitis is a painful swelling of the plantar fascia. The plantar fascia is a thick, fibrous layer of tissue. It covers the bones on the bottom of your foot. And it supports the foot bones in an arched position.  This can happen gradually or suddenly. It usually affects one foot at a time. Heel pain can be sharp, like a knife sticking into the bottom of your foot. You may feel pain after exercising, long-distance jogging, stair climbing, long periods of standing, or after standing up.  Risk factors include: non-active lifestyle, arthritis, diabetes, obesity or recent weight gain, flat foot, high arch. Wearing high heels, loose shoes, or shoes with poor arch support for long periods of time adds to the risk. This problem is commonly found in runners and dancers. It also found in people who stand on hard surfaces for long periods of time.  Foot pain from this condition is usually worse in the morning. But it improves with walking. By the end of the day there may be a dull aching. Treatment requires short-term rest and controlling swelling. It may take up to 9 months before all symptoms go away. Rarely, a steroid injection into the foot, or surgery, may be needed.  Home care    If you are overweight, lose weight to help healing.    Choose supportive shoes with good arch support and shock absorbency. Replace athletic shoes when they become worn out. Don t walk or run barefoot.    Premade or custom-fitted shoe inserts  may be helpful. Inserts made of silicone seem to be the most effective. Custom-made inserts can be provided by a podiatrist or foot specialist, physical therapist, or orthopedist.    Premade or custom-made night splints keep the heel stretched out while you sleep. They may prevent morning pain.    Avoid activities that stress the feet: jogging, prolonged standing or walking, contact sports, etc.    First thing in the morning and before sports, stretch the bottom of your feet. Gently flex your ankle so the toes move toward your knee.    Icing may help control heel pain. Apply an ice pack to the heel for 10-20 minutes as a preventive. Or ice your heel after a severe flare-up of symptoms. You may repeat this every 1-2 hours as needed.    You may use over-the-counter pain medicine to control pain, unless another medicine was prescribed. Anti-inflammatory pain medicines, such as ibuprofen or naproxen, may work better than acetaminophen. If you have chronic liver or kidney disease or ever had a stomach ulcer or GI bleeding, talk with your healthcare provider before using these medicines.  Follow-up care  Follow up with your healthcare provider, physical therapist, or podiatrist or foot specialist as advised.  Call for an appointment if pain worsens or there is no relief after a few weeks of home treatment. Shoe inserts, a night splint, or a special boot may be required.  If X-rays were taken, you will be told of any new findings that may affect your care.  When to seek medical advice  Call your healthcare provider right away if any of these occur:    Foot swelling    Redness with increasing pain  Date Last Reviewed: 11/21/2015 2000-2017 [employer]. [address] [place] [address], [place] [address]. All rights reserved. This information is not intended as a substitute for professional medical care. Always follow your healthcare professional's instructions.                Follow-ups after your visit         Follow-up notes from your care team     Return in about 1 month (around 9/30/2017).      Your next 10 appointments already scheduled     Oct 16, 2017  9:30 AM CDT   PFT VISIT with KAYLAN MAXWELL   [facility] ([facility])    [address] [place] [id number] [phone]            Oct 16, 2017 10:00 AM CDT   (Arrive by 9:45 AM)   Return Visit with Evelyn Lazo MD   [facility] ([facility])    [address] [place] [id number] [phone]              Who to contact     Please call your clinic at [phone] to:    Ask questions about your health    Make or cancel appointments    Discuss your medicines    Learn about your test results    Speak to your doctor   If you have compliments or concerns about an experience at your clinic, or if you wish to file a complaint, please contact [facility] Patient Relations at [phone] or email us at [email or web address]         Additional Information About Your Visit        [employer] Information     [employer] gives you secure access to your electronic health record. If you see a primary care provider, you can also send messages to your care team and make appointments. If you have questions, please call your primary care clinic.  If you do not have a primary care provider, please call [phone] and they will assist you.      [employer] is an electronic gateway that provides easy, online access to your medical records. With [employer], you can request a clinic appointment, read your test results, renew a prescription or communicate with your care team.     To access your existing account, please contact your [facility] or call [phone] for assistance.        Care EveryWhere ID     This is your Care EveryWhere ID. This could be used by other  "organizations to access your [facility] medical records  [id number]        Your Vitals Were     Height BMI (Body Mass Index)                1.676 m (5' 6\") 33.44 kg/m2           Blood Pressure from Last 3 Encounters:   07/19/17 141/83   05/25/16 131/88   01/27/16 133/89    Weight from Last 3 Encounters:   08/30/17 94 kg (207 lb 3.2 oz)   07/19/17 90.3 kg (199 lb)   01/27/16 96.6 kg (213 lb)              Today, you had the following     No orders found for display         Today's Medication Changes          These changes are accurate as of: 8/30/17  8:38 AM.  If you have any questions, ask your nurse or doctor.               These medicines have changed or have updated prescriptions.        Dose/Directions    * order for DME   This may have changed:  Another medication with the same name was added. Make sure you understand how and when to take each.   Changed by:  Tia Vallejo APRN CNP        Use your CPAP device as directed by your provider.   Refills:  0       * order for DME   This may have changed:  You were already taking a medication with the same name, and this prescription was added. Make sure you understand how and when to take each.   Used for:  Venous (peripheral) insufficiency   Changed by:  Paul Bruce DPM        Compression socks - knee - 15-20 mmHg   Quantity:  1 Units   Refills:  3       * Notice:  This list has 2 medication(s) that are the same as other medications prescribed for you. Read the directions carefully, and ask your doctor or other care provider to review them with you.         Where to get your medicines      Some of these will need a paper prescription and others can be bought over the counter.  Ask your nurse if you have questions.     Bring a paper prescription for each of these medications     order for DME                Primary Care Provider Office Phone # Fax #    [facility] [phone]       [address] " [address]  [facility] [address]        Equal Access to Services     NOVAVICKI KELLEY : Hadii mitchell ku hadnicolás Sojarret, waaxda luqadaha, qaybta kamadanda kvngchaparrotonio, waxchriss amie melisaisrael calderonnabila jamesdanydana sheikh . So [facility] [phone].    ATENCIÓN: Si habla español, tiene a brandt disposición servicios gratuitos de asistencia lingüística. Llame al [phone].    We comply with applicable federal civil rights laws and [place] laws. We do not discriminate on the basis of race, color, national origin, age, disability sex, sexual orientation or gender identity.            Thank you!     Thank you for choosing [facility]  for your care. Our goal is always to provide you with excellent care. Hearing back from our patients is one way we can continue to improve our services. Please take a few minutes to complete the written survey that you may receive in the mail after your visit with us. Thank you!             Your Updated Medication List - Protect others around you: Learn how to safely use, store and throw away your medicines at www.disposemymeds.org.          This list is accurate as of: 8/30/17  8:38 AM.  Always use your most recent med list.                   Brand Name Dispense Instructions for use Diagnosis    * ADDERALL XR PO      Take 20 mg by mouth        * ADDERALL PO      Take 10 mg by mouth        aspirin 81 MG tablet      Take 81 mg by mouth daily        EFFEXOR PO      Take 150 mg by mouth daily        Fiber Tabs      Take 2 tablets by mouth daily        fluticasone-salmeterol 250-50 MCG/DOSE diskus inhaler    ADVAIR    1 Inhaler    Inhale 1 puff into the lungs 2 times daily    Centrilobular emphysema (H)       glipiZIDE-metFORMIN 2.5-500 MG per tablet    METAGLIP     Take 1 tablet by mouth 2 times daily (before meals)        Melatonin 2.5 MG Chew      Take 2 tablets by mouth daily        OMEPRAZOLE PO      Take 1 tablet by mouth daily 1 20mg tablet once daily        * order for DME      Use your CPAP device as  directed by your provider.        * order for DME     1 Units    Compression socks - knee - 15-20 mmHg    Venous (peripheral) insufficiency       VALTREX 500 MG tablet   Generic drug:  valACYclovir      Take 500 mg by mouth 2 times daily        VENTOLIN IN      Inhale 2 puffs into the lungs 2 times daily        XANAX 0.5 MG tablet   Generic drug:  ALPRAZolam      Take 0.5 mg by mouth 2 times daily as needed        ZYRTEC PO      Take 1 tablet by mouth daily        * Notice:  This list has 4 medication(s) that are the same as other medications prescribed for you. Read the directions carefully, and ask your doctor or other care provider to review them with you.

## 2017-08-30 NOTE — NURSING NOTE
"Reason For Visit:   Chief Complaint   Patient presents with     Consult     Right foot pain, bottom of her foot arch to her heel, for a few months, wakes her up at night, can't go to sleep some nights       ?  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  Work status?  Full time.    Type of surgery: Bunion removed about 10 years ago, Left foot  Smoker: No  Ht 1.676 m (5' 6\")  Wt 94 kg (207 lb 3.2 oz)  BMI 33.44 kg/m2     Allergies   Allergen Reactions     Wellbutrin [Bupropion Hydrobromide] Other (See Comments)     Burning skin. Trav Brannon's syndrome.     Current Outpatient Prescriptions   Medication     glipiZIDE-metFORMIN (METAGLIP) 2.5-500 MG per tablet     aspirin 81 MG tablet     Melatonin 2.5 MG CHEW     Amphetamine-Dextroamphetamine (ADDERALL PO)     Venlafaxine HCl (EFFEXOR PO)     fluticasone-salmeterol (ADVAIR) 250-50 MCG/DOSE diskus inhaler     Amphetamine-Dextroamphetamine (ADDERALL XR PO)     ORDER FOR DME     OMEPRAZOLE PO     ALPRAZolam (XANAX) 0.5 MG tablet     Albuterol (VENTOLIN IN)     valACYclovir (VALTREX) 500 MG tablet     Cetirizine HCl (ZYRTEC PO)     Fiber TABS     No current facility-administered medications for this visit.                                                            "

## 2017-08-30 NOTE — PATIENT INSTRUCTIONS
Purchase PowerStep insoles, pinnacle model, on Amazon or [email or web address]       Plantar Fasciitis  Plantar fasciitis is a painful swelling of the plantar fascia. The plantar fascia is a thick, fibrous layer of tissue. It covers the bones on the bottom of your foot. And it supports the foot bones in an arched position.  This can happen gradually or suddenly. It usually affects one foot at a time. Heel pain can be sharp, like a knife sticking into the bottom of your foot. You may feel pain after exercising, long-distance jogging, stair climbing, long periods of standing, or after standing up.  Risk factors include: non-active lifestyle, arthritis, diabetes, obesity or recent weight gain, flat foot, high arch. Wearing high heels, loose shoes, or shoes with poor arch support for long periods of time adds to the risk. This problem is commonly found in runners and dancers. It also found in people who stand on hard surfaces for long periods of time.  Foot pain from this condition is usually worse in the morning. But it improves with walking. By the end of the day there may be a dull aching. Treatment requires short-term rest and controlling swelling. It may take up to 9 months before all symptoms go away. Rarely, a steroid injection into the foot, or surgery, may be needed.  Home care    If you are overweight, lose weight to help healing.    Choose supportive shoes with good arch support and shock absorbency. Replace athletic shoes when they become worn out. Don t walk or run barefoot.    Premade or custom-fitted shoe inserts may be helpful. Inserts made of silicone seem to be the most effective. Custom-made inserts can be provided by a podiatrist or foot specialist, physical therapist, or orthopedist.    Premade or custom-made night splints keep the heel stretched out while you sleep. They may prevent morning pain.    Avoid activities that stress the feet: jogging, prolonged standing or walking, contact sports,  etc.    First thing in the morning and before sports, stretch the bottom of your feet. Gently flex your ankle so the toes move toward your knee.    Icing may help control heel pain. Apply an ice pack to the heel for 10-20 minutes as a preventive. Or ice your heel after a severe flare-up of symptoms. You may repeat this every 1-2 hours as needed.    You may use over-the-counter pain medicine to control pain, unless another medicine was prescribed. Anti-inflammatory pain medicines, such as ibuprofen or naproxen, may work better than acetaminophen. If you have chronic liver or kidney disease or ever had a stomach ulcer or GI bleeding, talk with your healthcare provider before using these medicines.  Follow-up care  Follow up with your healthcare provider, physical therapist, or podiatrist or foot specialist as advised.  Call for an appointment if pain worsens or there is no relief after a few weeks of home treatment. Shoe inserts, a night splint, or a special boot may be required.  If X-rays were taken, you will be told of any new findings that may affect your care.  When to seek medical advice  Call your healthcare provider right away if any of these occur:    Foot swelling    Redness with increasing pain  Date Last Reviewed: 11/21/2015 2000-2017 [employer]. [address] [place] [address], [place] [address]. All rights reserved. This information is not intended as a substitute for professional medical care. Always follow your healthcare professional's instructions.

## 2017-10-04 ENCOUNTER — OFFICE VISIT (OUTPATIENT)
Dept: ORTHOPEDICS | Facility: CLINIC | Age: 52
End: 2017-10-04

## 2017-10-04 DIAGNOSIS — M72.2 PLANTAR FASCIITIS: Primary | ICD-10-CM

## 2017-10-04 NOTE — NURSING NOTE
Reason For Visit:   Chief Complaint   Patient presents with     RECHECK     Follow up. Plantar fasciitis, right foot. Post cortisone injection. Pt stated that she does not have any pain, is still doing her stretches, and ordered the support for her shoes.        Pain Assessment  Patient Currently in Pain: Denies       Current Outpatient Prescriptions   Medication Sig Dispense Refill     glipiZIDE-metFORMIN (METAGLIP) 2.5-500 MG per tablet Take 1 tablet by mouth 2 times daily (before meals)       aspirin 81 MG tablet Take 81 mg by mouth daily       Melatonin 2.5 MG CHEW Take 2 tablets by mouth daily       order for DME Compression socks - knee - 15-20 mmHg 1 Units 3     Amphetamine-Dextroamphetamine (ADDERALL PO) Take 10 mg by mouth       Venlafaxine HCl (EFFEXOR PO) Take 150 mg by mouth daily        fluticasone-salmeterol (ADVAIR) 250-50 MCG/DOSE diskus inhaler Inhale 1 puff into the lungs 2 times daily 1 Inhaler 11     Amphetamine-Dextroamphetamine (ADDERALL XR PO) Take 20 mg by mouth        ORDER FOR DME Use your CPAP device as directed by your provider.       OMEPRAZOLE PO Take 1 tablet by mouth daily 1 20mg tablet once daily       ALPRAZolam (XANAX) 0.5 MG tablet Take 0.5 mg by mouth 2 times daily as needed       Albuterol (VENTOLIN IN) Inhale 2 puffs into the lungs 2 times daily       valACYclovir (VALTREX) 500 MG tablet Take 500 mg by mouth 2 times daily       Cetirizine HCl (ZYRTEC PO) Take 1 tablet by mouth daily       Fiber TABS Take 2 tablets by mouth daily            Allergies   Allergen Reactions     Wellbutrin [Bupropion Hydrobromide] Other (See Comments)     Burning skin. Trav Brannon's syndrome.

## 2017-10-04 NOTE — MR AVS SNAPSHOT
After Visit Summary   10/4/2017    Rosey Davalos    MRN: [id number]           Patient Information     Date Of Birth          1965        Visit Information        Provider Department      10/4/2017 7:00 AM Paul Bruce DPM [facility]        Today's Diagnoses     Plantar fasciitis    -  1       Follow-ups after your visit        Your next 10 appointments already scheduled     Oct 16, 2017  9:30 AM CDT   PFT VISIT with  HEBER MAXWELL   [facility] ([facility])    [address] [place] [address] [phone]            Oct 16, 2017 10:00 AM CDT   (Arrive by 9:45 AM)   Return Visit with Evelyn Lazo MD   [facility] ([facility])    [address] [place] [address] [phone]              Who to contact     Please call your clinic at [phone] to:    Ask questions about your health    Make or cancel appointments    Discuss your medicines    Learn about your test results    Speak to your doctor   If you have compliments or concerns about an experience at your clinic, or if you wish to file a complaint, please contact [facility] Patient Relations at [phone] or email us at [email or web address]         Additional Information About Your Visit        MyChart Information     [employer] gives you secure access to your electronic health record. If you see a primary care provider, you can also send messages to your care team and make appointments. If you have questions, please call your primary care clinic.  If you do not have a primary care provider, please call [phone] and they will assist you.      [employer] is an electronic gateway that provides easy, online access to your medical records. With [employer], you can request a clinic appointment, read your test  results, renew a prescription or communicate with your care team.     To access your existing account, please contact your [facility] or call [phone] for assistance.        Care EveryWhere ID     This is your Care EveryWhere ID. This could be used by other organizations to access your [place] medical records  [id number]         Blood Pressure from Last 3 Encounters:   07/19/17 141/83   05/25/16 131/88   01/27/16 133/89    Weight from Last 3 Encounters:   08/30/17 94 kg (207 lb 3.2 oz)   07/19/17 90.3 kg (199 lb)   01/27/16 96.6 kg (213 lb)              Today, you had the following     No orders found for display       Primary Care Provider Office Phone # Fax #    [facility] [phone]       [facility] [address] [facility] [address]        Equal Access to Services     NAYAN BENSON : Hadii aad ku hadasho Soomaali, waaxda luqadaha, qaybta kaalmada adeegyada, waxay idiin hayaan erik kharadana sheikh . So [facility] [phone].    ATENCIÓN: Si habla español, tiene a brandt disposición servicios gratuitos de asistencia lingüística. Rogeliodarcy al [phone].    We comply with applicable federal civil rights laws and [place] laws. We do not discriminate on the basis of race, color, national origin, age, disability, sex, sexual orientation, or gender identity.            Thank you!     Thank you for choosing [facility]  for your care. Our goal is always to provide you with excellent care. Hearing back from our patients is one way we can continue to improve our services. Please take a few minutes to complete the written survey that you may receive in the mail after your visit with us. Thank you!             Your Updated Medication List - Protect others around you: Learn how to safely use, store and throw away your medicines at www.disposemymeds.org.          This list is accurate as of: 10/4/17  7:21 AM.  Always use your most recent med list.                    Brand Name Dispense Instructions for use Diagnosis    * ADDERALL XR PO      Take 20 mg by mouth        * ADDERALL PO      Take 10 mg by mouth        aspirin 81 MG tablet      Take 81 mg by mouth daily        EFFEXOR PO      Take 150 mg by mouth daily        Fiber Tabs      Take 2 tablets by mouth daily        fluticasone-salmeterol 250-50 MCG/DOSE diskus inhaler    ADVAIR    1 Inhaler    Inhale 1 puff into the lungs 2 times daily    Centrilobular emphysema (H)       glipiZIDE-metFORMIN 2.5-500 MG per tablet    METAGLIP     Take 1 tablet by mouth 2 times daily (before meals)        Melatonin 2.5 MG Chew      Take 2 tablets by mouth daily        OMEPRAZOLE PO      Take 1 tablet by mouth daily 1 20mg tablet once daily        * order for DME      Use your CPAP device as directed by your provider.        * order for DME     1 Units    Compression socks - knee - 15-20 mmHg    Venous (peripheral) insufficiency       VALTREX 500 MG tablet   Generic drug:  valACYclovir      Take 500 mg by mouth 2 times daily        VENTOLIN IN      Inhale 2 puffs into the lungs 2 times daily        XANAX 0.5 MG tablet   Generic drug:  ALPRAZolam      Take 0.5 mg by mouth 2 times daily as needed        ZYRTEC PO      Take 1 tablet by mouth daily        * Notice:  This list has 4 medication(s) that are the same as other medications prescribed for you. Read the directions carefully, and ask your doctor or other care provider to review them with you.

## 2017-10-04 NOTE — PROGRESS NOTES
Chief Complaint:   Chief Complaint   Patient presents with     RECHECK     Follow up. Plantar fasciitis, right foot. Post cortisone injection. Pt stated that she does not have any pain, is still doing her stretches, and ordered the support for her shoes.           Allergies   Allergen Reactions     Wellbutrin [Bupropion Hydrobromide] Other (See Comments)     Burning skin. Trav Brannon's syndrome.         Subjective: Rosey is a 52 year old female who presents to the clinic today for a follow up of right foot plantar fasciitis. She relates that since having the injection, she is having no pain at all in her foot. She relates she did get the partial-thickness among those. She had been wearing the night splint, and is intermittently wearing. She did get a parapatellar steps and she is wearing these. She is very happy with her results from the injection and the other modalities.    Objective    No pain is noted with palpation of the right plantar fascia today. No pain is noted along the courses of the PT, peroneals, Achilles tendon on the right.    Assessment: Resolved right plantar fasciitis.     Plan:   - Pt seen and evaluated  - Continue with the stretching and the PowerSteps.   - Pt to return to clinic PRN.

## 2017-10-04 NOTE — LETTER
10/4/2017       RE: Rosey Davalos  [address]  [place] [place] [address]     Dear Colleague,    Thank you for referring your patient, Rosey Davalos, to the [facility] at [facility]. Please see a copy of my visit note below.    Chief Complaint:   Chief Complaint   Patient presents with     RECHECK     Follow up. Plantar fasciitis, right foot. Post cortisone injection. Pt stated that she does not have any pain, is still doing her stretches, and ordered the support for her shoes.           Allergies   Allergen Reactions     Wellbutrin [Bupropion Hydrobromide] Other (See Comments)     Burning skin. Trav Brannon's syndrome.         Subjective: Rosey is a 52 year old female who presents to the clinic today for a follow up of right foot plantar fasciitis. She relates that since having the injection, she is having no pain at all in her foot. She relates she did get the partial-thickness among those. She had been wearing the night splint, and is intermittently wearing. She did get a parapatellar steps and she is wearing these. She is very happy with her results from the injection and the other modalities.    Objective    No pain is noted with palpation of the right plantar fascia today. No pain is noted along the courses of the PT, peroneals, Achilles tendon on the right.    Assessment: Resolved right plantar fasciitis.     Plan:   - Pt seen and evaluated  - Continue with the stretching and the PowerSteps.   - Pt to return to clinic PRN.       Again, thank you for allowing me to participate in the care of your patient.      Sincerely,    Paul Bruce DPM

## 2017-10-16 ENCOUNTER — OFFICE VISIT (OUTPATIENT)
Dept: PULMONOLOGY | Facility: CLINIC | Age: 52
End: 2017-10-16
Attending: INTERNAL MEDICINE
Payer: COMMERCIAL

## 2017-10-16 VITALS
DIASTOLIC BLOOD PRESSURE: 90 MMHG | OXYGEN SATURATION: 95 % | HEART RATE: 101 BPM | SYSTOLIC BLOOD PRESSURE: 146 MMHG | RESPIRATION RATE: 16 BRPM

## 2017-10-16 DIAGNOSIS — J44.9 OBSTRUCTIVE LUNG DISEASE (H): Primary | ICD-10-CM

## 2017-10-16 DIAGNOSIS — J44.9 CHRONIC OBSTRUCTIVE PULMONARY DISEASE, UNSPECIFIED COPD TYPE (H): Primary | ICD-10-CM

## 2017-10-16 PROCEDURE — 99212 OFFICE O/P EST SF 10 MIN: CPT | Mod: ZF

## 2017-10-16 ASSESSMENT — PAIN SCALES - GENERAL: PAINLEVEL: NO PAIN (0)

## 2017-10-16 NOTE — LETTER
10/16/2017       RE: Rosey Davalos  [address]  [place] [address]     Dear Colleague,    Thank you for referring your patient, Rosey Davalos, to the [facility] at [facility]. Please see a copy of my visit note below.    CHIEF COMPLAINT:  COPD.      HISTORY OF PRESENT ILLNESS:  The patient is a 52-year-old woman here for ongoing followup of COPD.  Today, she tells me that her breathing is pretty good.  She says that her formally changed her from the mometasone/formoterol to fluticasone/salmeterol.  She felt like it significantly improved her breathing and her overall breathing control.  She uses albuterol for rescue.  She says that she took a recent trip to [place] where there was a high humidity.  She had an increase in her albuterol use when she was there, but did not notice any additional symptoms.  She has not needed any prednisone or antibiotics since our last visit.  She says that she is using her albuterol a little bit more than she was when she was being treated for asthma, but says that for the most part she has been able to manage her breathing.  She was recently diagnosed with diabetes and has been taking metformin and glipizide for that.  She says that she had noticed some fluid retention about a month ago.  Today it is back down to normal.  She does have significant fatigue and has been postulating that might be related to her recent diagnosis of diabetes.      PAST MEDICAL HISTORY:   1.  COPD.   2.  ERNIE.   3.  Depression.   4.  Diabetes.      FAMILY HISTORY:  Mother had depression and alcohol abuse.  Father had depression and alcohol abuse.  Maternal grandmother had stomach cancer.      SOCIAL HISTORY:  Has approximately 30-pack-year smoking history, quit in 2004.  She has 3 kids and she is employed by [employer].      REVIEW OF SYSTEMS:  A full 14-point review of systems was done and is negative except as noted above in  the HPI.      PHYSICAL EXAMINATION:   VITAL SIGNS:  Blood pressure 146/90, pulse is 101, respiratory rate is 16, SpO2 is 95% on room air.   GENERAL APPEARANCE:  Well-developed, well-nourished, in no distress.   EYES:  PERRL.  No conjunctival injection.   HENT:  Mucous membranes are moist.   RESPIRATORY:  Good air movement bilaterally, no wheezes, rales or rhonchi.   CARDIOVASCULAR:  Regular rate and rhythm.  Systolic murmur noted.  No lower extremity edema.  JVP not appreciated with patient upright at 90 degrees.      RESULTS:  Pulmonary function tests reviewed by me.  Today's spirometry is normal with a normal DLCO.  Prior spirometry showed obstruction with no significant bronchodilator response.      CT scan from 11/2015, formal impression by Dr. Winston:   1.  Pulmonary nodules as described above.  No followup is recommended in low risk patients.  A 1-year followup with CT scan in high respirations.   2.  Resolution of ground-glass opacities in the right lung.      ASSESSMENT AND PLAN:  The patient is a 52-year-old woman here for ongoing followup.     1.  Chronic obstructive pulmonary disease.  The patient is currently taking fluticasone and salmeterol.  She says that she has had good improvement in her breathing.  Uses albuterol for rescue.  She is working on weight loss and exercise and has been planning on resuming an exercise regimen in the future.  We talked about interval training and exercise strategies to help with reconditioning.  She is looking forward to resuming an exercise program in the near future.     2.  Pulmonary nodules.  The patient has a previous CT scan showing 2 mm pulmonary nodules which in 2015 were unchanged since 2013.  She quit smoking in 2004 and CT scan screening for lung cancer is likely not indicated in this patient.      I will have her come back and see me in approximately 1 year.         GENNA MAZARIEGSO MD             D: 10/16/2017 10:51   T: 10/16/2017 11:21   MT: ANGELO       Name:     CHASTITY GUTIÉRREZ   MRN:      [id number]        Account:      [id number]   :      1965           Service Date: 10/16/2017      Document: [id number]        Again, thank you for allowing me to participate in the care of your patient.      Sincerely,    Evelyn Lazo MD

## 2017-10-16 NOTE — PATIENT INSTRUCTIONS
It was nice to see you again today.  Your pulmonary function testing looks great today. I'm glad you're liking the Advair. Albuterol as needed for shortness of breath.    Evelyn Lazo

## 2017-10-16 NOTE — NURSING NOTE
Chief Complaint   Patient presents with     COPD     Patient is being seen for COPD follow up      Ann Carlos CMA at 2:38 PM on 10/16/2017

## 2017-10-16 NOTE — LETTER
Date:October 24, 2017      Patient was self referred, no letter generated. Do not send.        [facility] Health Information

## 2017-10-16 NOTE — MR AVS SNAPSHOT
After Visit Summary   10/16/2017    Rosey Davalos    MRN: [id number]           Patient Information     Date Of Birth          1965        Visit Information        Provider Department      10/16/2017 10:00 AM Evelyn Lazo MD [facility]        Care Instructions    It was nice to see you again today.  Your pulmonary function testing looks great today. I'm glad you're liking the Advair. Albuterol as needed for shortness of breath.    Evelyn Lazo           Follow-ups after your visit        Follow-up notes from your care team     Return in about 1 year (around 10/16/2018).      Your next 10 appointments already scheduled     Oct 15, 2018  7:30 AM CDT   (Arrive by 7:15 AM)   Return Visit with Evelyn Lazo MD   [facility] ([facility])    [address] [place] [address] [phone]              Who to contact     If you have questions or need follow up information about today's clinic visit or your schedule please contact [facility] directly at [phone].  Normal or non-critical lab and imaging results will be communicated to you by MyChart, letter or phone within 4 business days after the clinic has received the results. If you do not hear from us within 7 days, please contact the clinic through MyChart or phone. If you have a critical or abnormal lab result, we will notify you by phone as soon as possible.  Submit refill requests through [employer] or call your pharmacy and they will forward the refill request to us. Please allow 3 business days for your refill to be completed.          Additional Information About Your Visit        MyChart Information     [employer] gives you secure access to your electronic health record. If you see a primary care provider, you can also send messages to your care team  and make appointments. If you have questions, please call your primary care clinic.  If you do not have a primary care provider, please call [phone] and they will assist you.        Care EveryWhere ID     This is your Care EveryWhere ID. This could be used by other organizations to access your [place] medical records  [id number]        Your Vitals Were     Pulse Respirations Pulse Oximetry             101 16 95%          Blood Pressure from Last 3 Encounters:   10/16/17 146/90   07/19/17 141/83   05/25/16 131/88    Weight from Last 3 Encounters:   08/30/17 94 kg (207 lb 3.2 oz)   07/19/17 90.3 kg (199 lb)   01/27/16 96.6 kg (213 lb)              Today, you had the following     No orders found for display       Primary Care Provider Office Phone # Fax #    [facility] [phone]       [place] [address]        Equal Access to Services     NAYAN [facility] : Hadii aad ku hadasho Soomaali, waaxda luqadaha, qaybta kaalmada adeegyada, waxay idiin hayaan kvngeg jamesaradana sheikh . So [facility] [phone].    ATENCIÓN: Si habla español, tiene a brandt disposición servicios gratuitos de asistencia lingüística. Ll[facility] [phone].    We comply with applicable federal civil rights laws and [place] laws. We do not discriminate on the basis of race, color, national origin, age, disability, sex, sexual orientation, or gender identity.            Thank you!     Thank you for choosing [facility]  for your care. Our goal is always to provide you with excellent care. Hearing back from our patients is one way we can continue to improve our services. Please take a few minutes to complete the written survey that you may receive in the mail after your visit with us. Thank you!             Your Updated Medication List - Protect others around you: Learn how to safely use, store and throw away your medicines at www.disposemymeds.org.          This  list is accurate as of: 10/16/17 10:50 AM.  Always use your most recent med list.                   Brand Name Dispense Instructions for use Diagnosis    * ADDERALL XR PO      Take 20 mg by mouth        * ADDERALL PO      Take 10 mg by mouth        aspirin 81 MG tablet      Take 81 mg by mouth daily        EFFEXOR PO      Take 150 mg by mouth daily        Fiber Tabs      Take 2 tablets by mouth daily        fluticasone-salmeterol 250-50 MCG/DOSE diskus inhaler    ADVAIR    1 Inhaler    Inhale 1 puff into the lungs 2 times daily    Centrilobular emphysema (H)       glipiZIDE-metFORMIN 2.5-500 MG per tablet    METAGLIP     Take 1 tablet by mouth 2 times daily (before meals)        Melatonin 2.5 MG Chew      Take 2 tablets by mouth daily        OMEPRAZOLE PO      Take 1 tablet by mouth daily 1 20mg tablet once daily        * order for DME      Use your CPAP device as directed by your provider.        * order for DME     1 Units    Compression socks - knee - 15-20 mmHg    Venous (peripheral) insufficiency       VALTREX 500 MG tablet   Generic drug:  valACYclovir      Take 500 mg by mouth 2 times daily        VENTOLIN IN      Inhale 2 puffs into the lungs 2 times daily        XANAX 0.5 MG tablet   Generic drug:  ALPRAZolam      Take 0.5 mg by mouth 2 times daily as needed        ZYRTEC PO      Take 1 tablet by mouth daily        * Notice:  This list has 4 medication(s) that are the same as other medications prescribed for you. Read the directions carefully, and ask your doctor or other care provider to review them with you.

## 2017-10-16 NOTE — PROGRESS NOTES
CHIEF COMPLAINT:  COPD.      HISTORY OF PRESENT ILLNESS:  The patient is a 52-year-old woman here for ongoing followup of COPD.  Today, she tells me that her breathing is pretty good.  She says that her formally changed her from the mometasone/formoterol to fluticasone/salmeterol.  She felt like it significantly improved her breathing and her overall breathing control.  She uses albuterol for rescue.  She says that she took a recent trip to [place] where there was a high humidity.  She had an increase in her albuterol use when she was there, but did not notice any additional symptoms.  She has not needed any prednisone or antibiotics since our last visit.  She says that she is using her albuterol a little bit more than she was when she was being treated for asthma, but says that for the most part she has been able to manage her breathing.  She was recently diagnosed with diabetes and has been taking metformin and glipizide for that.  She says that she had noticed some fluid retention about a month ago.  Today it is back down to normal.  She does have significant fatigue and has been postulating that might be related to her recent diagnosis of diabetes.      PAST MEDICAL HISTORY:   1.  COPD.   2.  ERNIE.   3.  Depression.   4.  Diabetes.      FAMILY HISTORY:  Mother had depression and alcohol abuse.  Father had depression and alcohol abuse.  Maternal grandmother had stomach cancer.      SOCIAL HISTORY:  Has approximately 30-pack-year smoking history, quit in 2004.  She has 3 kids and she is employed by [employer].      REVIEW OF SYSTEMS:  A full 14-point review of systems was done and is negative except as noted above in the HPI.      PHYSICAL EXAMINATION:   VITAL SIGNS:  Blood pressure 146/90, pulse is 101, respiratory rate is 16, SpO2 is 95% on room air.   GENERAL APPEARANCE:  Well-developed, well-nourished, in no distress.   EYES:  PERRL.  No conjunctival injection.   HENT:  Mucous membranes are moist.    RESPIRATORY:  Good air movement bilaterally, no wheezes, rales or rhonchi.   CARDIOVASCULAR:  Regular rate and rhythm.  Systolic murmur noted.  No lower extremity edema.  JVP not appreciated with patient upright at 90 degrees.      RESULTS:  Pulmonary function tests reviewed by me.  Today's spirometry is normal with a normal DLCO.  Prior spirometry showed obstruction with no significant bronchodilator response.      CT scan from 2015, formal impression by Dr. Winston:   1.  Pulmonary nodules as described above.  No followup is recommended in low risk patients.  A 1-year followup with CT scan in high respirations.   2.  Resolution of ground-glass opacities in the right lung.      ASSESSMENT AND PLAN:  The patient is a 52-year-old woman here for ongoing followup.     1.  Chronic obstructive pulmonary disease.  The patient is currently taking fluticasone and salmeterol.  She says that she has had good improvement in her breathing.  Uses albuterol for rescue.  She is working on weight loss and exercise and has been planning on resuming an exercise regimen in the future.  We talked about interval training and exercise strategies to help with reconditioning.  She is looking forward to resuming an exercise program in the near future.     2.  Pulmonary nodules.  The patient has a previous CT scan showing 2 mm pulmonary nodules which in 2015 were unchanged since .  She quit smoking in  and CT scan screening for lung cancer is likely not indicated in this patient.      I will have her come back and see me in approximately 1 year.         GENNA MAZARIEGOS MD             D: 10/16/2017 10:51   T: 10/16/2017 11:21   MT: LG      Name:     CHASTITY GUTIÉRREZ   MRN:      [id number]        Account:      [id number]   :      1965           Service Date: 10/16/2017      Document: [id number]

## 2017-10-26 LAB
DLCOUNC-%PRED-PRE: 132 %
DLCOUNC-PRE: 30.79 ML/MIN/MMHG
DLCOUNC-PRED: 23.27 ML/MIN/MMHG
ERV-%PRED-PRE: 39 %
ERV-PRE: 0.25 L
ERV-PRED: 0.63 L
EXPTIME-PRE: 8.14 SEC
FEF2575-%PRED-PRE: 62 %
FEF2575-PRE: 1.61 L/SEC
FEF2575-PRED: 2.59 L/SEC
FEFMAX-%PRED-PRE: 90 %
FEFMAX-PRE: 6.27 L/SEC
FEFMAX-PRED: 6.95 L/SEC
FEV1-%PRED-PRE: 81 %
FEV1-PRE: 2.2 L
FEV1FEV6-PRE: 77 %
FEV1FEV6-PRED: 82 %
FEV1FVC-PRE: 74 %
FEV1FVC-PRED: 81 %
FEV1SVC-PRE: 69 %
FEV1SVC-PRED: 73 %
FIFMAX-PRE: 6.33 L/SEC
FVC-%PRED-PRE: 88 %
FVC-PRE: 2.96 L
FVC-PRED: 3.34 L
IC-%PRED-PRE: 95 %
IC-PRE: 2.92 L
IC-PRED: 3.05 L
VA-%PRED-PRE: 82 %
VA-PRE: 4.42 L
VC-%PRED-PRE: 86 %
VC-PRE: 3.17 L
VC-PRED: 3.68 L

## 2017-12-03 ENCOUNTER — HEALTH MAINTENANCE LETTER (OUTPATIENT)
Age: 52
End: 2017-12-03

## 2018-04-02 DIAGNOSIS — J43.2 CENTRILOBULAR EMPHYSEMA (H): ICD-10-CM

## 2019-01-03 ENCOUNTER — PATIENT OUTREACH (OUTPATIENT)
Dept: CARE COORDINATION | Facility: CLINIC | Age: 54
End: 2019-01-03

## 2019-01-07 ENCOUNTER — OFFICE VISIT (OUTPATIENT)
Dept: PULMONOLOGY | Facility: CLINIC | Age: 54
End: 2019-01-07
Attending: INTERNAL MEDICINE
Payer: COMMERCIAL

## 2019-01-07 VITALS
HEIGHT: 66 IN | DIASTOLIC BLOOD PRESSURE: 89 MMHG | WEIGHT: 206 LBS | BODY MASS INDEX: 33.11 KG/M2 | OXYGEN SATURATION: 96 % | HEART RATE: 98 BPM | SYSTOLIC BLOOD PRESSURE: 127 MMHG | RESPIRATION RATE: 18 BRPM

## 2019-01-07 DIAGNOSIS — J44.9 CHRONIC OBSTRUCTIVE PULMONARY DISEASE, UNSPECIFIED COPD TYPE (H): Primary | ICD-10-CM

## 2019-01-07 PROCEDURE — G0463 HOSPITAL OUTPT CLINIC VISIT: HCPCS | Mod: ZF

## 2019-01-07 ASSESSMENT — PAIN SCALES - GENERAL: PAINLEVEL: NO PAIN (0)

## 2019-01-07 ASSESSMENT — MIFFLIN-ST. JEOR: SCORE: 1556.16

## 2019-01-07 NOTE — PROGRESS NOTES
"Pulmonary Follow-up     ID: 54 yo female hx of pulmonary nodules, former smoker diagnosed with Asthma/COPD 2014 seen in Pulmonary clinic for follow-up.     INTERIM   She was last seen in 2016. Overall she has been doing well. Has chronic symptoms but stable. CAT score 18 with mostly fatigue symptoms.  Last exacerbation ~ 2 years ago. Maintenance inhaler Advair - was previoulsy on symbicort with worsening control improved now back on Advair     Current Outpatient Medications   Medication     ADVAIR DISKUS 250-50 MCG/DOSE diskus inhaler     Albuterol (VENTOLIN IN)     ALPRAZolam (XANAX) 0.5 MG tablet     Amphetamine-Dextroamphetamine (ADDERALL PO)     Amphetamine-Dextroamphetamine (ADDERALL XR PO)     aspirin 81 MG tablet     Cetirizine HCl (ZYRTEC PO)     Dulaglutide (TRULICITY SC)     Fiber TABS     glipiZIDE-metFORMIN (METAGLIP) 2.5-500 MG per tablet     Melatonin 2.5 MG CHEW     OMEPRAZOLE PO     order for DME     ORDER FOR DME     valACYclovir (VALTREX) 500 MG tablet     Venlafaxine HCl (EFFEXOR PO)     No current facility-administered medications for this visit.            Allergies   Allergen Reactions     Wellbutrin [Bupropion Hydrobromide] Other (See Comments)     Burning skin. Trav Brannon's syndrome.     EXAM  /89   Pulse 98   Resp 18   Ht 1.676 m (5' 6\")   Wt 93.4 kg (206 lb)   SpO2 96%   BMI 33.25 kg/m    AOx3 NAD  Nares patent, OP clear no lesion  Unlabored resp, clear to auscultation bilaterally movign good air   RRR S1 S2  Abd soft, No edema   Neuro grossly intact       DATA  No new data     IMPRESSION/PLAN  1. COPD - GOLD B - stable  - continue current regimen  - flu and pna vaccine utd.   - watch for s/sx of flare - any questions or concerns - call immediately [phone]. Nursing line  - repeat PFTS in 6-12 month with FUP at that time     Frankie Pandey    "

## 2019-01-07 NOTE — NURSING NOTE
Chief Complaint   Patient presents with     COPD     Follow up on Kindra and her COPD     Cyrus Dietz CMA at 4:29 PM on 1/7/2019

## 2019-01-07 NOTE — PATIENT INSTRUCTIONS
1. COPD - GOLD B - stable  - continue current regimen  - flu and pna vaccine utd.   - watch for s/sx of flare - any questions or concerns - call immediately [phone]. Nursing line  - repeat PFTS in 6-12 month with FUP at that time

## 2019-01-07 NOTE — LETTER
"1/7/2019       RE: Rosey Davalos  [address]  [place] [address]     Dear Colleague,    Thank you for referring your patient, Rosey Davalos, to the [facility] at [facility]. Please see a copy of my visit note below.    Pulmonary Follow-up     ID: 52 yo female hx of pulmonary nodules, former smoker diagnosed with Asthma/COPD 2014 seen in Pulmonary clinic for follow-up.     INTERIM   She was last seen in 2016. Overall she has been doing well. Has chronic symptoms but stable. CAT score 18 with mostly fatigue symptoms.  Last exacerbation ~ 2 years ago. Maintenance inhaler Advair - was previoulsy on symbicort with worsening control improved now back on Advair     Current Outpatient Medications   Medication     ADVAIR DISKUS 250-50 MCG/DOSE diskus inhaler     Albuterol (VENTOLIN IN)     ALPRAZolam (XANAX) 0.5 MG tablet     Amphetamine-Dextroamphetamine (ADDERALL PO)     Amphetamine-Dextroamphetamine (ADDERALL XR PO)     aspirin 81 MG tablet     Cetirizine HCl (ZYRTEC PO)     Dulaglutide (TRULICITY SC)     Fiber TABS     glipiZIDE-metFORMIN (METAGLIP) 2.5-500 MG per tablet     Melatonin 2.5 MG CHEW     OMEPRAZOLE PO     order for DME     ORDER FOR DME     valACYclovir (VALTREX) 500 MG tablet     Venlafaxine HCl (EFFEXOR PO)     No current facility-administered medications for this visit.            Allergies   Allergen Reactions     Wellbutrin [Bupropion Hydrobromide] Other (See Comments)     Burning skin. Trav Brannon's syndrome.     EXAM  /89   Pulse 98   Resp 18   Ht 1.676 m (5' 6\")   Wt 93.4 kg (206 lb)   SpO2 96%   BMI 33.25 kg/m     AOx3 NAD  Nares patent, OP clear no lesion  Unlabored resp, clear to auscultation bilaterally movign good air   RRR S1 S2  Abd soft, No edema   Neuro grossly intact       DATA  No new data     IMPRESSION/PLAN  1. COPD - GOLD B - stable  - continue current regimen  - flu and pna vaccine utd.   - " watch for s/sx of flare - any questions or concerns - call immediately [phone]. Nursing line  - repeat PFTS in 6-12 month with FUP at that time     Frankie Pandey      Again, thank you for allowing me to participate in the care of your patient.      Sincerely,    Frankie Pandey MD

## 2019-04-23 DIAGNOSIS — J43.2 CENTRILOBULAR EMPHYSEMA (H): ICD-10-CM

## 2020-01-22 ENCOUNTER — HOSPITAL PATHOLOGY (OUTPATIENT)
Dept: OTHER | Facility: CLINIC | Age: 55
End: 2020-01-22

## 2020-01-24 LAB — COPATH REPORT: NORMAL

## 2020-03-02 ENCOUNTER — HEALTH MAINTENANCE LETTER (OUTPATIENT)
Age: 55
End: 2020-03-02

## 2020-06-12 ENCOUNTER — VIRTUAL VISIT (OUTPATIENT)
Dept: PULMONOLOGY | Facility: CLINIC | Age: 55
End: 2020-06-12
Attending: INTERNAL MEDICINE
Payer: COMMERCIAL

## 2020-06-12 DIAGNOSIS — J44.9 CHRONIC OBSTRUCTIVE PULMONARY DISEASE, UNSPECIFIED COPD TYPE (H): Primary | ICD-10-CM

## 2020-06-12 RX ORDER — SIMVASTATIN 40 MG
TABLET ORAL
COMMUNITY
Start: 2020-04-03

## 2020-06-12 RX ORDER — LIRAGLUTIDE 6 MG/ML
INJECTION SUBCUTANEOUS DAILY
COMMUNITY

## 2020-06-12 RX ORDER — ALBUTEROL SULFATE 90 UG/1
2 POWDER, METERED RESPIRATORY (INHALATION) EVERY 6 HOURS PRN
Qty: 3 INHALER | Refills: 4 | Status: SHIPPED | OUTPATIENT
Start: 2020-06-12 | End: 2023-07-18

## 2020-06-12 RX ORDER — METFORMIN HCL 500 MG
1000 TABLET, EXTENDED RELEASE 24 HR ORAL 2 TIMES DAILY WITH MEALS
COMMUNITY
Start: 2020-03-11

## 2020-06-12 RX ORDER — LOSARTAN POTASSIUM AND HYDROCHLOROTHIAZIDE 12.5; 5 MG/1; MG/1
TABLET ORAL DAILY
COMMUNITY
Start: 2020-04-03

## 2020-06-12 NOTE — LETTER
"    6/12/2020         RE: Rosey Davalos  [address]  [place] [facility] [place] [facility] [address]        Dear Colleague,    Thank you for referring your patient, Rosey Davalos, to the [facility]. Please see a copy of my visit note below.    Rosey Davalos is a 55 year old female who is being evaluated via a billable video visit.      The patient has been notified of following:     \"This video visit will be conducted via a call between you and your physician/provider. We have found that certain health care needs can be provided without the need for an in-person physical exam.  This service lets us provide the care you need with a video conversation.  If a prescription is necessary we can send it directly to your pharmacy.  If lab work is needed we can place an order for that and you can then stop by our lab to have the test done at a later time.    Video visits are billed at different rates depending on your insurance coverage.  Please reach out to your insurance provider with any questions.    If during the course of the call the physician/provider feels a video visit is not appropriate, you will not be charged for this service.\"    Patient has given verbal consent for Video visit? Yes    Will anyone else be joining your video visit? No        Video-Visit Details    Type of service:  Video Visit    Video Start Time: 10:05  Video End Time: 10:33 AM    Originating Location (pt. Location): Home    Distant Location (provider location):  [facility]     Platform used for Video Visit: [employer]      Pulmonary Follow-up     ID: 54 yo female hx of pulmonary nodules, former smoker diagnosed with Asthma/COPD 2019 seen in Pulmonary clinic for follow-up.     INTERIM   She was last seen in Jan 2019 with stable respiratory symptoms with plan to continue on advair.     Since then notices increase dyspnea  Cough similar but having to use rescue inhaler mores jh before and after " activities. Noted flare in Feb fever, URI symptoms and loss of taste and smell - compatible with covid syndrome, did not seek testing as unavailable at that time, snce recovered but some increased symptoms as above         Current Outpatient Medications   Medication     ADVAIR DISKUS 250-50 MCG/DOSE inhaler     Albuterol (VENTOLIN IN)     alpha-lipoic acid 100 MG capsule     ALPRAZolam (XANAX) 0.5 MG tablet     Amphetamine-Dextroamphetamine (ADDERALL PO)     Amphetamine-Dextroamphetamine (ADDERALL XR PO)     aspirin 81 MG tablet     Cetirizine HCl (ZYRTEC PO)     Fiber TABS     liraglutide (VICTOZA) 18 MG/3ML solution     losartan-hydrochlorothiazide (HYZAAR) 50-12.5 MG tablet     Melatonin 2.5 MG CHEW     metFORMIN (GLUCOPHAGE-XR) 500 MG 24 hr tablet     OMEPRAZOLE PO     order for DME     ORDER FOR DME     simvastatin (ZOCOR) 40 MG tablet     valACYclovir (VALTREX) 500 MG tablet     Venlafaxine HCl (EFFEXOR PO)     No current facility-administered medications for this visit.            Allergies   Allergen Reactions     Wellbutrin [Bupropion Hydrobromide] Other (See Comments)     Burning skin. Trav Brannon's syndrome.     No Clinical Screening - See Comments Nausea and Vomiting     Trulicity      Penicillins      Other reaction(s): Vaginal Irritation     EXAM  There were no vitals taken for this visit.      DATA  No new data     IMPRESSION/RENU  1. COPD - GOLD B - thought with some asthmatic features, some chronic symptom progression  2. Likely COVID -19 in Feb - timing and clinical picture compatible       1. Change from advair to Breo - (similar meds) Breo once a day dosing  2. Order Alpha-1 antitrypsin test (blood test for copd)   3. Order covid antibody test  4. Order PFTS and CXR for 1-2 down the road -  5. Refilled new albuterol prescription  6. FUP - 4-6 months      Frankie Pandey MD          Again, thank you for allowing me to participate in the care of your patient.         Sincerely,        Frankie Pandey MD

## 2020-06-12 NOTE — PROGRESS NOTES
"Rosey Davalos is a 55 year old female who is being evaluated via a billable video visit.      The patient has been notified of following:     \"This video visit will be conducted via a call between you and your physician/provider. We have found that certain health care needs can be provided without the need for an in-person physical exam.  This service lets us provide the care you need with a video conversation.  If a prescription is necessary we can send it directly to your pharmacy.  If lab work is needed we can place an order for that and you can then stop by our lab to have the test done at a later time.    Video visits are billed at different rates depending on your insurance coverage.  Please reach out to your insurance provider with any questions.    If during the course of the call the physician/provider feels a video visit is not appropriate, you will not be charged for this service.\"    Patient has given verbal consent for Video visit? Yes    Will anyone else be joining your video visit? No        Video-Visit Details    Type of service:  Video Visit    Video Start Time: 10:05  Video End Time: 10:33 AM    Originating Location (pt. Location): Home    Distant Location (provider location):  [facility]     Platform used for Video Visit: [facility]      Pulmonary Follow-up     ID: 54 yo female hx of pulmonary nodules, former smoker diagnosed with Asthma/COPD 2019 seen in Pulmonary clinic for follow-up.     INTERIM   She was last seen in Jan 2019 with stable respiratory symptoms with plan to continue on advair.     Since then notices increase dyspnea  Cough similar but having to use rescue inhaler mores jh before and after activities. Noted flare in Feb fever, URI symptoms and loss of taste and smell - compatible with covid syndrome, did not seek testing as unavailable at that time, snce recovered but some increased symptoms as above         Current Outpatient Medications "   Medication     ADVAIR DISKUS 250-50 MCG/DOSE inhaler     Albuterol (VENTOLIN IN)     alpha-lipoic acid 100 MG capsule     ALPRAZolam (XANAX) 0.5 MG tablet     Amphetamine-Dextroamphetamine (ADDERALL PO)     Amphetamine-Dextroamphetamine (ADDERALL XR PO)     aspirin 81 MG tablet     Cetirizine HCl (ZYRTEC PO)     Fiber TABS     liraglutide (VICTOZA) 18 MG/3ML solution     losartan-hydrochlorothiazide (HYZAAR) 50-12.5 MG tablet     Melatonin 2.5 MG CHEW     metFORMIN (GLUCOPHAGE-XR) 500 MG 24 hr tablet     OMEPRAZOLE PO     order for DME     ORDER FOR DME     simvastatin (ZOCOR) 40 MG tablet     valACYclovir (VALTREX) 500 MG tablet     Venlafaxine HCl (EFFEXOR PO)     No current facility-administered medications for this visit.            Allergies   Allergen Reactions     Wellbutrin [Bupropion Hydrobromide] Other (See Comments)     Burning skin. Trav Brannon's syndrome.     No Clinical Screening - See Comments Nausea and Vomiting     Trulicity      Penicillins      Other reaction(s): Vaginal Irritation     EXAM  There were no vitals taken for this visit.      DATA  No new data     IMPRESSION/RENU  1. COPD - GOLD B - thought with some asthmatic features, some chronic symptom progression  2. Likely COVID -19 in Feb - timing and clinical picture compatible       1. Change from advair to Breo - (similar meds) Breo once a day dosing  2. Order Alpha-1 antitrypsin test (blood test for copd)   3. Order covid antibody test  4. Order PFTS and CXR for 1-2 down the road -  5. Refilled new albuterol prescription  6. FUP - 4-6 months      Frankie Pandey MD

## 2020-06-12 NOTE — PATIENT INSTRUCTIONS
1. Change from advair to Breo - (similar meds) Breo once a day dosing  2. Order Alpha-1 antitrypsin test (blood test for copd)   3. Order covid antibody test  4. Order PFTS and CXR for 1-2 down the road -  5. Refilled new albuterol prescription  6. FUP - 4-6 months

## 2020-06-26 DIAGNOSIS — J44.9 CHRONIC OBSTRUCTIVE PULMONARY DISEASE, UNSPECIFIED COPD TYPE (H): ICD-10-CM

## 2020-06-26 LAB
COVID-19 ANTIBODY IGG: POSITIVE
LAB TEST METHOD: ABNORMAL

## 2020-06-26 NOTE — LETTER
June 27, 2020        Rosey Davalos  [address]  [place] [address]          COVID-19 Antibody, IgG   Date Value Ref Range Status   06/26/2020 Positive (A) NEG^Negative Final     Comment:     Antibodies to COVID-19 detected, which may be due to past or current infection   to the virus. Positive results may be due to past or present infection with   non-SARS-CoV-2 coronavirus strains, such as coronavirus HKU1, NL63, OC43, or   229E.  Results from antibody testing should not be used as the sole basis to diagnose   or exclude SARS-CoV-2 infection or to inform infection status.           You have tested POSITIVE for COVID-19 antibodies. This means we found antibodies for the virus in your blood.     What does it mean if my test finds COVID-19 antibodies?    If we find these antibodies, it suggests:     You have had the virus.     Your body s immune system fought the virus.     We don t know if this will help protect you from getting COVID-19 again. Scientists are still learning about this.    If you have COVID-19 symptoms now, please stay home and away from others.     Your current symptoms may or may not be COVID-19.     What is antibody testing?    This is a kind of blood test. We take a small sample of your blood, and then test it for something called  antibodies.      Your body makes antibodies to fight infection. If your blood has antibodies for a certain germ, it means you ve been infected with that germ in the past.     Sometimes, antibodies stay in your body for years after you ve had the infection. They can be there even if the germ didn t make you sick. They are a sign that your body fought off the infection.    Will this test find antibodies in everyone who s had COVID-19?    No. The test finds antibodies in most people 10 days after they get sick. For some people, it takes longer than 10 days for antibodies to show up. Others may never show antibodies against COVID-19, especially if they have weak  immune systems.    What are the signs of COVID-19?    Signs of COVID-19 can appear from 2 to 14 days (up to 2 weeks) after you re infected. Some people have no symptoms or only mild symptoms. Others get very sick. The most common symptoms are:      Cough    Shortness of breath or trouble breathing      Or at least 2 of these symptoms:    Fever    Chills    Repeated shaking with chills    Muscle pain    Headache    Sore throat    Losing your sense of taste or smell    You may have other symptoms. Please contact your doctor or clinic for any symptoms that worry you.    Where can I get more information?     To learn the [facility] guidelines for staying home, please visit the [place] Department of Health website at [email or web address]/[email or web address]    To learn more about COVID-19 and how to care for yourself at home, please visit the CDC website at https://www.cdc.gov/coronavirus/2019-ncov/about/steps-when-sick.html    For more options for care at [facility], please visit our website at [email or web address]    [facility] ([facility]) COVID-19 Hotline:  [phone]

## 2020-12-20 ENCOUNTER — HEALTH MAINTENANCE LETTER (OUTPATIENT)
Age: 55
End: 2020-12-20

## 2021-03-08 ENCOUNTER — TELEPHONE (OUTPATIENT)
Dept: PULMONOLOGY | Facility: CLINIC | Age: 56
End: 2021-03-08

## 2021-03-08 NOTE — TELEPHONE ENCOUNTER
[facility] Call Center    Phone Message    May a detailed message be left on voicemail: yes     Reason for Call: Other: Pt would like a call back to know if she can or should still go for her pft testing as she has had other treatments for breathing recently and would like to discuss     Action Taken: Message routed to:  [facility] pulm    Travel Screening: Not Applicable                                                                      
Pt has 5 more days of prednisone.  Her appointment with Dr. Pandey is not until 3/19 - I have suggested she reschedule PFTs prior to appt, but after she completes prednisone. Pt agrees with plan and has no further questions at this time.  
Render Post-Care Instructions In Note?: yes
Detail Level: Detailed
Consent: The patient's consent was obtained including but not limited to risks of crusting, scabbing, blistering, scarring, darker or lighter pigmentary change, recurrence, incomplete removal and infection.
Duration Of Freeze Thaw-Cycle (Seconds): 0
Post-Care Instructions: I reviewed with the patient in detail post-care instructions. Patient is to wear sunprotection, and avoid picking at any of the treated lesions. Pt may apply Vaseline to crusted or scabbing areas.  Patient made aware that some lesions may take more than one treatment to fully resolve and some lesion may recur.  Patient was instructed to return to the office if lesions are not resolved after 2 months.
Render Note In Bullet Format When Appropriate: No

## 2021-03-17 ENCOUNTER — ANCILLARY PROCEDURE (OUTPATIENT)
Dept: GENERAL RADIOLOGY | Facility: CLINIC | Age: 56
End: 2021-03-17
Attending: INTERNAL MEDICINE
Payer: COMMERCIAL

## 2021-03-17 DIAGNOSIS — J44.9 CHRONIC OBSTRUCTIVE PULMONARY DISEASE, UNSPECIFIED COPD TYPE (H): ICD-10-CM

## 2021-03-17 LAB
DLCOUNC-%PRED-PRE: 125 %
DLCOUNC-PRE: 27.7 ML/MIN/MMHG
DLCOUNC-PRED: 22.06 ML/MIN/MMHG
ERV-%PRED-PRE: 37 %
ERV-PRE: 0.26 L
ERV-PRED: 0.68 L
EXPTIME-PRE: 8.34 SEC
FEF2575-%PRED-PRE: 68 %
FEF2575-PRE: 1.7 L/SEC
FEF2575-PRED: 2.47 L/SEC
FEFMAX-%PRED-PRE: 96 %
FEFMAX-PRE: 6.62 L/SEC
FEFMAX-PRED: 6.85 L/SEC
FEV1-%PRED-PRE: 84 %
FEV1-PRE: 2.23 L
FEV1FEV6-PRE: 77 %
FEV1FEV6-PRED: 82 %
FEV1FVC-PRE: 75 %
FEV1FVC-PRED: 81 %
FEV1SVC-PRE: 69 %
FEV1SVC-PRED: 72 %
FIFMAX-PRE: 7.46 L/SEC
FRCPLETH-%PRED-PRE: 74 %
FRCPLETH-PRE: 2.09 L
FRCPLETH-PRED: 2.81 L
FVC-%PRED-PRE: 90 %
FVC-PRE: 2.96 L
FVC-PRED: 3.27 L
IC-%PRED-PRE: 101 %
IC-PRE: 2.99 L
IC-PRED: 2.95 L
RVPLETH-%PRED-PRE: 95 %
RVPLETH-PRE: 1.83 L
RVPLETH-PRED: 1.91 L
TLCPLETH-%PRED-PRE: 96 %
TLCPLETH-PRE: 5.08 L
TLCPLETH-PRED: 5.27 L
VA-%PRED-PRE: 85 %
VA-PRE: 4.51 L
VC-%PRED-PRE: 89 %
VC-PRE: 3.25 L
VC-PRED: 3.63 L

## 2021-03-17 PROCEDURE — 94375 RESPIRATORY FLOW VOLUME LOOP: CPT | Performed by: INTERNAL MEDICINE

## 2021-03-17 PROCEDURE — 94726 PLETHYSMOGRAPHY LUNG VOLUMES: CPT | Performed by: INTERNAL MEDICINE

## 2021-03-17 PROCEDURE — 71046 X-RAY EXAM CHEST 2 VIEWS: CPT | Performed by: RADIOLOGY

## 2021-03-17 PROCEDURE — 94729 DIFFUSING CAPACITY: CPT | Performed by: INTERNAL MEDICINE

## 2021-03-19 ENCOUNTER — VIRTUAL VISIT (OUTPATIENT)
Dept: PULMONOLOGY | Facility: CLINIC | Age: 56
End: 2021-03-19
Attending: INTERNAL MEDICINE
Payer: COMMERCIAL

## 2021-03-19 DIAGNOSIS — J44.89 ASTHMA-COPD OVERLAP SYNDROME (H): Primary | ICD-10-CM

## 2021-03-19 DIAGNOSIS — J43.2 CENTRILOBULAR EMPHYSEMA (H): ICD-10-CM

## 2021-03-19 DIAGNOSIS — R91.8 PULMONARY NODULES: ICD-10-CM

## 2021-03-19 PROCEDURE — 99214 OFFICE O/P EST MOD 30 MIN: CPT | Mod: 95 | Performed by: INTERNAL MEDICINE

## 2021-03-19 NOTE — PATIENT INSTRUCTIONS
PFT within normal limits     Continue breo  Use albuterol as needed for activity  Expectant management for nodule - (no planned CT surveillance)   Call or return to clinic any time for any question

## 2021-03-19 NOTE — LETTER
3/19/2021         RE: Rosey Davalos  [address]  [facility] [address]        Dear Colleague,    Thank you for referring your patient, Rosey Davalos, to the [facility] FOR LUNG SCIENCE AND [facility]. Please see a copy of my visit note below.    Kindra is a 55 year old who is being evaluated via a billable video visit.      How would you like to obtain your AVS? Capicalhart  If the video visit is dropped, the invitation should be resent by: Other e-mail: Mychart  Will anyone else be joining your video visit? 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    CXR clear  PFTS wnl     Overall has done well with Breo not as active as would like to be. Is recovering from recent flare - started 2 weeks ago - with increase chest tightness - minimally responsive to albuterol thus went in and dose prednisone which completed ~ 1 week ago and now feeling at baseline.  She mentions this is first flare in 2 years    She is able to perform activities without limitation though mentions some increased fatigued and desire to increase exercise activity but has been limited. She mentions she is going snow tubing today asked if ok (yes) as well mentioned will be doing polar plunge    Reviewed smoking history ~ 1-1.5 pk for 20 years quitting in 2004 ( ~30 pkyr - 17 year since stopped) - discussed as whether to continue CT surveillance       Current Outpatient Medications   Medication     ADVAIR DISKUS 250-50 MCG/DOSE inhaler     albuterol (PROAIR RESPICLICK) 108 (90 Base) MCG/ACT inhaler     Albuterol (VENTOLIN IN)      alpha-lipoic acid 100 MG capsule     ALPRAZolam (XANAX) 0.5 MG tablet     Amphetamine-Dextroamphetamine (ADDERALL PO)     Amphetamine-Dextroamphetamine (ADDERALL XR PO)     aspirin 81 MG tablet     Cetirizine HCl (ZYRTEC PO)     Fiber TABS     fluticasone-vilanterol (BREO ELLIPTA) 200-25 MCG/INH inhaler     liraglutide (VICTOZA) 18 MG/3ML solution     losartan-hydrochlorothiazide (HYZAAR) 50-12.5 MG tablet     Melatonin 2.5 MG CHEW     metFORMIN (GLUCOPHAGE-XR) 500 MG 24 hr tablet     OMEPRAZOLE PO     order for DME     ORDER FOR DME     simvastatin (ZOCOR) 40 MG tablet     valACYclovir (VALTREX) 500 MG tablet     Venlafaxine HCl (EFFEXOR PO)     No current facility-administered medications for this visit.            Allergies   Allergen Reactions     Wellbutrin [Bupropion Hydrobromide] Other (See Comments)     Burning skin. Trav Brannon's syndrome.     No Clinical Screening - See Comments Nausea and Vomiting     Trulicity      Penicillins      Other reaction(s): Vaginal Irritation     EXAM  There were no vitals taken for this visit.  Alert oriented no acute distress  Unlabored resp speaking in full sentences    DATA  CXR  Heart and pulmonary vasculature within normal limits. The lungs are  clear. Pleural spaces are clear. Cholecystectomy clips right upper  quadrant.     CT From 2015 Heart size is normal. No pericardial effusion.  Normal thoracic  vasculature. No thoracic lymphadenopathy. Central tracheobronchial  tree is patent. No pleural effusion or pneumothorax. Emphysematous  changes.  Resolution of previously seen groundglass opacities in the right lung.  2 mm pulmonary nodule in the right upper lobe (series 4, image 65),  unchanged since 11/20/2013  2 mm pulmonary nodule in the right lower lobe (series 4, image 166),  unchanged since 11/20/2013  2 mm pulmonary nodule in the right middle lobe (series 4, image 147),  new     Scattered calcified granulomas are seen.  Bones and soft tissues: No suspicious bone  findings. Mild degenerative  findings the thoracic spine.     Partially imaged upper abdomen: Limited. Post cholecystectomy.  Hypodense lesion in the liver too small to characterize.    Normal PFTs    IMPRESSION/PLAN  1. Asthma/COPD overlap  given normal PFTS but noted emphysmatous changes on CT, predominant asthma phenotype  , recent exacerbation  - counseled on early recognition and treatment   - rec increased use of albuterol particularly with activity and with upcoming outdoor events  2. COVID -19 Recovered - discussed getting vaccine.    3. Pulmonary nodules - discussed repeat surveillance and rationale (nodules usually asymptomatic) - reviewed risk benefit last nodule 2mm 2015, at present outside window of yearly surveillance given to repeat; shared decision to follow clinically at this point      FUP 1 yr time     Frankie Pandey MD    Total of 37 min spent

## 2021-03-19 NOTE — PROGRESS NOTES
Kindra is a 55 year old who is being evaluated via a billable video visit.      How would you like to obtain your AVS? [email or web address]  If the video visit is dropped, the invitation should be resent by: Other e-mail: Sinaprerna  Will anyone else be joining your video visit? 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    CXR clear  PFTS wnl     Overall has done well with Breo not as active as would like to be. Is recovering from recent flare - started 2 weeks ago - with increase chest tightness - minimally responsive to albuterol thus went in and dose prednisone which completed ~ 1 week ago and now feeling at baseline.  She mentions this is first flare in 2 years    She is able to perform activities without limitation though mentions some increased fatigued and desire to increase exercise activity but has been limited. She mentions she is going snow tubing today asked if ok (yes) as well mentioned will be doing polar plunge    Reviewed smoking history ~ 1-1.5 pk for 20 years quitting in 2004 ( ~30 pkyr - 17 year since stopped) - discussed as whether to continue CT surveillance       Current Outpatient Medications   Medication     ADVAIR DISKUS 250-50 MCG/DOSE inhaler     albuterol (PROAIR RESPICLICK) 108 (90 Base) MCG/ACT inhaler     Albuterol (VENTOLIN IN)     alpha-lipoic acid 100 MG capsule     ALPRAZolam (XANAX) 0.5 MG tablet     Amphetamine-Dextroamphetamine (ADDERALL PO)     Amphetamine-Dextroamphetamine (ADDERALL XR PO)     aspirin 81 MG tablet     Cetirizine HCl (ZYRTEC PO)     Fiber TABS     fluticasone-vilanterol (BREO ELLIPTA) 200-25  MCG/INH inhaler     liraglutide (VICTOZA) 18 MG/3ML solution     losartan-hydrochlorothiazide (HYZAAR) 50-12.5 MG tablet     Melatonin 2.5 MG CHEW     metFORMIN (GLUCOPHAGE-XR) 500 MG 24 hr tablet     OMEPRAZOLE PO     order for DME     ORDER FOR DME     simvastatin (ZOCOR) 40 MG tablet     valACYclovir (VALTREX) 500 MG tablet     Venlafaxine HCl (EFFEXOR PO)     No current facility-administered medications for this visit.            Allergies   Allergen Reactions     Wellbutrin [Bupropion Hydrobromide] Other (See Comments)     Burning skin. Trav Brannon's syndrome.     No Clinical Screening - See Comments Nausea and Vomiting     Trulicity      Penicillins      Other reaction(s): Vaginal Irritation     EXAM  There were no vitals taken for this visit.  Alert oriented no acute distress  Unlabored resp speaking in full sentences    DATA  CXR  Heart and pulmonary vasculature within normal limits. The lungs are  clear. Pleural spaces are clear. Cholecystectomy clips right upper  quadrant.     CT From 2015 Heart size is normal. No pericardial effusion.  Normal thoracic  vasculature. No thoracic lymphadenopathy. Central tracheobronchial  tree is patent. No pleural effusion or pneumothorax. Emphysematous  changes.  Resolution of previously seen groundglass opacities in the right lung.  2 mm pulmonary nodule in the right upper lobe (series 4, image 65),  unchanged since 11/20/2013  2 mm pulmonary nodule in the right lower lobe (series 4, image 166),  unchanged since 11/20/2013  2 mm pulmonary nodule in the right middle lobe (series 4, image 147),  new     Scattered calcified granulomas are seen.  Bones and soft tissues: No suspicious bone findings. Mild degenerative  findings the thoracic spine.     Partially imaged upper abdomen: Limited. Post cholecystectomy.  Hypodense lesion in the liver too small to characterize.    Normal PFTs    IMPRESSION/PLAN  1. Asthma/COPD overlap  given normal PFTS but noted emphysmatous  changes on CT, predominant asthma phenotype  , recent exacerbation  - counseled on early recognition and treatment   - rec increased use of albuterol particularly with activity and with upcoming outdoor events  2. COVID -19 Recovered - discussed getting vaccine.    3. Pulmonary nodules - discussed repeat surveillance and rationale (nodules usually asymptomatic) - reviewed risk benefit last nodule 2mm 2015, at present outside window of yearly surveillance given to repeat; shared decision to follow clinically at this point      FUP 1 yr time     Frankie Pandey MD    Total of 37 min spent

## 2021-04-24 ENCOUNTER — HEALTH MAINTENANCE LETTER (OUTPATIENT)
Age: 56
End: 2021-04-24

## 2021-07-06 DIAGNOSIS — J44.9 CHRONIC OBSTRUCTIVE PULMONARY DISEASE, UNSPECIFIED COPD TYPE (H): ICD-10-CM

## 2021-10-03 ENCOUNTER — HEALTH MAINTENANCE LETTER (OUTPATIENT)
Age: 56
End: 2021-10-03

## 2022-03-20 ENCOUNTER — HEALTH MAINTENANCE LETTER (OUTPATIENT)
Age: 57
End: 2022-03-20

## 2022-05-15 ENCOUNTER — HEALTH MAINTENANCE LETTER (OUTPATIENT)
Age: 57
End: 2022-05-15

## 2022-07-26 DIAGNOSIS — J44.9 CHRONIC OBSTRUCTIVE PULMONARY DISEASE, UNSPECIFIED COPD TYPE (H): ICD-10-CM

## 2022-09-01 ASSESSMENT — ENCOUNTER SYMPTOMS
NIGHT SWEATS: 0
SNORES LOUDLY: 0
COUGH: 1
PANIC: 0
INCREASED ENERGY: 0
DECREASED CONCENTRATION: 1
HALLUCINATIONS: 0
NERVOUS/ANXIOUS: 1
HOT FLASHES: 1
DECREASED LIBIDO: 0
HEMOPTYSIS: 0
COUGH DISTURBING SLEEP: 1
POLYPHAGIA: 0
SHORTNESS OF BREATH: 0
ALTERED TEMPERATURE REGULATION: 1
DEPRESSION: 1
DYSPNEA ON EXERTION: 0
WEIGHT GAIN: 0
DECREASED APPETITE: 0
FATIGUE: 1
CHILLS: 0
POSTURAL DYSPNEA: 0
WHEEZING: 0
POLYDIPSIA: 0
SPUTUM PRODUCTION: 1
FEVER: 0
INSOMNIA: 0
WEIGHT LOSS: 0

## 2022-09-02 ENCOUNTER — OFFICE VISIT (OUTPATIENT)
Dept: PULMONOLOGY | Facility: CLINIC | Age: 57
End: 2022-09-02
Attending: INTERNAL MEDICINE
Payer: COMMERCIAL

## 2022-09-02 VITALS — DIASTOLIC BLOOD PRESSURE: 82 MMHG | OXYGEN SATURATION: 95 % | SYSTOLIC BLOOD PRESSURE: 163 MMHG | HEART RATE: 82 BPM

## 2022-09-02 DIAGNOSIS — J44.1 COPD EXACERBATION (H): ICD-10-CM

## 2022-09-02 DIAGNOSIS — R91.8 PULMONARY NODULES: ICD-10-CM

## 2022-09-02 DIAGNOSIS — J44.89 ASTHMA-COPD OVERLAP SYNDROME (H): Primary | ICD-10-CM

## 2022-09-02 PROCEDURE — 99215 OFFICE O/P EST HI 40 MIN: CPT | Performed by: INTERNAL MEDICINE

## 2022-09-02 PROCEDURE — G0463 HOSPITAL OUTPT CLINIC VISIT: HCPCS

## 2022-09-02 PROCEDURE — 99417 PROLNG OP E/M EACH 15 MIN: CPT | Performed by: INTERNAL MEDICINE

## 2022-09-02 RX ORDER — AZITHROMYCIN 250 MG/1
TABLET, FILM COATED ORAL
Qty: 6 TABLET | Refills: 0 | Status: SHIPPED | OUTPATIENT
Start: 2022-09-02 | End: 2024-02-02

## 2022-09-02 RX ORDER — TRAZODONE HYDROCHLORIDE 50 MG/1
TABLET, FILM COATED ORAL
COMMUNITY
Start: 2022-08-23

## 2022-09-02 RX ORDER — METHYLPHENIDATE HYDROCHLORIDE 10 MG/1
10 TABLET ORAL 3 TIMES DAILY
COMMUNITY
Start: 2022-07-22

## 2022-09-02 RX ORDER — PREDNISONE 20 MG/1
20 TABLET ORAL DAILY
Qty: 10 TABLET | Refills: 0 | Status: SHIPPED | OUTPATIENT
Start: 2022-09-02 | End: 2024-02-02

## 2022-09-02 ASSESSMENT — PAIN SCALES - GENERAL: PAINLEVEL: NO PAIN (0)

## 2022-09-02 NOTE — LETTER
9/2/2022         RE: Rosey Davalos  [address]  [facility] [address]        Dear Colleague,    Thank you for referring your patient, Rosey Davalos, to the [facility] FOR LUNG SCIENCE AND HEALTH CLINIC [place]. Please see a copy of my visit note below.    Pulmonary Follow-up     ID: 56 yo female hx of pulmonary nodules (last followed 2015), former smoker diagnosed with Asthma/COPD 2019 seen in Pulmonary clinic for follow-up. She was last seen March 2021 (video) with stable symptoms and spirometry. Discussed nodule follow-up - deferred prior with ongoing discussion     PULM PROBLEM LIST  1. Asthma/COPD Overlap  2. Pulmonary Nodules (last CT 2015 , stable since 2013)       INTERIM HISTORY    Overall has been doing well.  Breathing has been stable, denies any flares or exacerbations.   Does endorse some MAYA but unchanged from prior.  Trying to increase activity and exercise but inconsistent. Taking Breo consistently     Reviewed smoking history ~ 1-1.5 pk for 20 years quitting in 2004 though actually believes closer to 2000  ( ~30 pkyr - 17- 22 year since stopped) -      CAT score : 13  Cough 3, phlegm 1 tight 2 breath 2 activity 2 confidence 0, sleep 0, energy 3    Soc: discussed hardship growing up     Current Outpatient Medications   Medication     albuterol (PROAIR RESPICLICK) 108 (90 Base) MCG/ACT inhaler     Albuterol (VENTOLIN IN)     alpha-lipoic acid 100 MG capsule     ALPRAZolam (XANAX) 0.5 MG tablet     aspirin 81 MG tablet     azithromycin (ZITHROMAX) 250 MG tablet     Cetirizine HCl (ZYRTEC PO)     Fiber TABS     fluticasone-vilanterol (BREO ELLIPTA) 200-25 MCG/INH inhaler     liraglutide (VICTOZA) 18 MG/3ML solution     losartan-hydrochlorothiazide (HYZAAR) 50-12.5 MG tablet     Melatonin 2.5 MG CHEW     metFORMIN (GLUCOPHAGE-XR) 500 MG 24 hr tablet     OMEPRAZOLE PO     ORDER FOR DME     predniSONE (DELTASONE) 20 MG tablet     simvastatin (ZOCOR) 40 MG tablet     valACYclovir  (VALTREX) 500 MG tablet     Venlafaxine HCl (EFFEXOR PO)     Amphetamine-Dextroamphetamine (ADDERALL PO)     Amphetamine-Dextroamphetamine (ADDERALL XR PO)     methylphenidate (RITALIN) 10 MG tablet     order for DME     traZODone (DESYREL) 50 MG tablet     No current facility-administered medications for this visit.           Allergies   Allergen Reactions     Wellbutrin [Bupropion Hydrobromide] Other (See Comments)     Burning skin. Trav Brannon's syndrome.     Other [No Clinical Screening - See Comments] Nausea and Vomiting     Trulicity      Penicillins      Other reaction(s): Vaginal Irritation     EXAM  BP (!) 163/82 (BP Location: Right arm, Patient Position: Chair, Cuff Size: Adult Regular)   Pulse 82   SpO2 95%   Alert oriented no acute distress  Unlabored resp speaking in full sentences, clear breath sounds  RRR S1S2  Skin no obvious lesion  Neuro grossly intact,   No peripheral edema       DATA  CXR 3/21 Heart and pulmonary vasculature within normal limits. The lungs are clear. Pleural spaces are clear. Cholecystectomy clips right upper quadrant.     CT From 2015 Heart size is normal. No pericardial effusion.  Normal thoracic vasculature. No thoracic lymphadenopathy. Central tracheobronchial tree is patent. No pleural effusion or pneumothorax. Emphysematous changes.Resolution of previously seen groundglass opacities in the right lung. 2 mm pulmonary nodule in the right upper lobe (series 4, image 65), unchanged since 11/20/2013 2 mm pulmonary nodule in the right lower lobe (series 4, image 166), unchanged since 11/20/2013 2 mm pulmonary nodule in the right middle lobe (series 4, image 147), new     Scattered calcified granulomas are seen. Bones and soft tissues: No suspicious bone findings. Mild degenerative findings the thoracic spine.     Partially imaged upper abdomen: Limited. Post cholecystectomy. Hypodense lesion in the liver too small to characterize.    Normal PFTs    IMPRESSION/PLAN  1.  Asthma/COPD overlap, follow-up PFTS but noted emphysmatous changes on CT, predominant asthma phenotype  , no recent exacerbations (last COVID 12/21) - counseled on early recognition and treatment (ordered prednisone and zpak to have on hand as part of action plan)   - rec increased use of albuterol particularly with activity and with upcoming outdoor events  2. COVID -19 (infection x 2) , 3x vaccine  Discussed omicron booster in Fall, withflu. Other vaccines utd (pna)     3. Pulmonary nodules - discussed repeat surveillance and rationale (nodules usually asymptomatic) - last nodule 2mm 2015,  will repeat CT after discussion today  4. PTSD - counseled on resilience - pt shared difficulty  uprbringing and strides she has made to overcome setback      FUP 1 yr time     Franike Pandey MD  A total of 69 min spent on the encounter today regarding the issues above     Answers for HPI/ROS submitted by the patient on 9/1/2022  General Symptoms: Yes  Skin Symptoms: No  HENT Symptoms: No  EYE SYMPTOMS: No  HEART SYMPTOMS: No  LUNG SYMPTOMS: Yes  INTESTINAL SYMPTOMS: No  URINARY SYMPTOMS: No  GYNECOLOGIC SYMPTOMS: Yes  BREAST SYMPTOMS: No  SKELETAL SYMPTOMS: No  BLOOD SYMPTOMS: No  NERVOUS SYSTEM SYMPTOMS: No  MENTAL HEALTH SYMPTOMS: Yes  Fever: No  Loss of appetite: No  Weight loss: No  Weight gain: No  Fatigue: Yes  Night sweats: No  Chills: No  Increased stress: Yes  Excessive hunger: No  Excessive thirst: No  Feeling hot or cold when others believe the temperature is normal: Yes  Loss of height: No  Post-operative complications: No  Surgical site pain: No  Hallucinations: No  Change in or Loss of Energy: No  Hyperactivity: No  Confusion: No  Cough: Yes  Sputum or phlegm: Yes  Coughing up blood: No  Difficulty breating or shortness of breath: No  Snoring: No  Wheezing: No  Difficulty breathing on exertion: No  Nighttime Cough: Yes  Difficulty breathing when lying flat: No  Bleeding or spotting between periods:  No  Heavy or painful periods: No  Irregular periods: No  Vaginal discharge: No  Hot flashes: Yes  Vaginal dryness: No  Genital ulcers: No  Reduced libido: No  Painful intercourse: No  Difficulty with sexual arousal: No  Post-menopausal bleeding: No  Nervous or Anxious: Yes  Depression: Yes  Trouble sleeping: No  Trouble thinking or concentrating: Yes  Mood changes: Yes  Panic attacks: No          Again, thank you for allowing me to participate in the care of your patient.        Sincerely,        Frankie Pandey MD

## 2022-09-02 NOTE — NURSING NOTE
Chief Complaint   Patient presents with     Follow Up     COPD follow up      Vitals were taken and medications were reconciled.     Karey Franco RMA  8:36 AM

## 2022-09-02 NOTE — PROGRESS NOTES
Pulmonary Follow-up     ID: 56 yo female hx of pulmonary nodules (last followed 2015), former smoker diagnosed with Asthma/COPD 2019 seen in Pulmonary clinic for follow-up. She was last seen March 2021 (video) with stable symptoms and spirometry. Discussed nodule follow-up - deferred prior with ongoing discussion     PULM PROBLEM LIST  1. Asthma/COPD Overlap  2. Pulmonary Nodules (last CT 2015 , stable since 2013)       INTERIM HISTORY    Overall has been doing well.  Breathing has been stable, denies any flares or exacerbations.   Does endorse some MAYA but unchanged from prior.  Trying to increase activity and exercise but inconsistent. Taking Breo consistently     Reviewed smoking history ~ 1-1.5 pk for 20 years quitting in 2004 though actually believes closer to 2000  ( ~30 pkyr - 17- 22 year since stopped) -      CAT score : 13  Cough 3, phlegm 1 tight 2 breath 2 activity 2 confidence 0, sleep 0, energy 3    Soc: discussed hardship growing up     Current Outpatient Medications   Medication     albuterol (PROAIR RESPICLICK) 108 (90 Base) MCG/ACT inhaler     Albuterol (VENTOLIN IN)     alpha-lipoic acid 100 MG capsule     ALPRAZolam (XANAX) 0.5 MG tablet     aspirin 81 MG tablet     azithromycin (ZITHROMAX) 250 MG tablet     Cetirizine HCl (ZYRTEC PO)     Fiber TABS     fluticasone-vilanterol (BREO ELLIPTA) 200-25 MCG/INH inhaler     liraglutide (VICTOZA) 18 MG/3ML solution     losartan-hydrochlorothiazide (HYZAAR) 50-12.5 MG tablet     Melatonin 2.5 MG CHEW     metFORMIN (GLUCOPHAGE-XR) 500 MG 24 hr tablet     OMEPRAZOLE PO     ORDER FOR DME     predniSONE (DELTASONE) 20 MG tablet     simvastatin (ZOCOR) 40 MG tablet     valACYclovir (VALTREX) 500 MG tablet     Venlafaxine HCl (EFFEXOR PO)     Amphetamine-Dextroamphetamine (ADDERALL PO)     Amphetamine-Dextroamphetamine (ADDERALL XR PO)     methylphenidate (RITALIN) 10 MG tablet     order for DME     traZODone (DESYREL) 50 MG tablet     No current  facility-administered medications for this visit.           Allergies   Allergen Reactions     Wellbutrin [Bupropion Hydrobromide] Other (See Comments)     Burning skin. Trav Brannon's syndrome.     Other [No Clinical Screening - See Comments] Nausea and Vomiting     Trulicity      Penicillins      Other reaction(s): Vaginal Irritation     EXAM  BP (!) 163/82 (BP Location: Right arm, Patient Position: Chair, Cuff Size: Adult Regular)   Pulse 82   SpO2 95%   Alert oriented no acute distress  Unlabored resp speaking in full sentences, clear breath sounds  RRR S1S2  Skin no obvious lesion  Neuro grossly intact,   No peripheral edema       DATA  CXR 3/21 Heart and pulmonary vasculature within normal limits. The lungs are clear. Pleural spaces are clear. Cholecystectomy clips right upper quadrant.     CT From 2015 Heart size is normal. No pericardial effusion.  Normal thoracic vasculature. No thoracic lymphadenopathy. Central tracheobronchial tree is patent. No pleural effusion or pneumothorax. Emphysematous changes.Resolution of previously seen groundglass opacities in the right lung. 2 mm pulmonary nodule in the right upper lobe (series 4, image 65), unchanged since 11/20/2013 2 mm pulmonary nodule in the right lower lobe (series 4, image 166), unchanged since 11/20/2013 2 mm pulmonary nodule in the right middle lobe (series 4, image 147), new     Scattered calcified granulomas are seen. Bones and soft tissues: No suspicious bone findings. Mild degenerative findings the thoracic spine.     Partially imaged upper abdomen: Limited. Post cholecystectomy. Hypodense lesion in the liver too small to characterize.    Normal PFTs    IMPRESSION/PLAN  1. Asthma/COPD overlap, follow-up PFTS but noted emphysmatous changes on CT, predominant asthma phenotype  , no recent exacerbations (last COVID 12/21) - counseled on early recognition and treatment (ordered prednisone and zpak to have on hand as part of action plan)   - rec  increased use of albuterol particularly with activity and with upcoming outdoor events  2. COVID -19 (infection x 2) , 3x vaccine  Discussed omicron booster in Fall, withflu. Other vaccines utd (pna)     3. Pulmonary nodules - discussed repeat surveillance and rationale (nodules usually asymptomatic) - last nodule 2mm 2015,  will repeat CT after discussion today  4. PTSD - counseled on resilience - pt shared difficulty  uprbringing and strides she has made to overcome setback      FUP 1 yr time     Frankie Pandey MD  A total of 69 min spent on the encounter today regarding the issues above     Answers for HPI/ROS submitted by the patient on 9/1/2022  General Symptoms: Yes  Skin Symptoms: No  HENT Symptoms: No  EYE SYMPTOMS: No  HEART SYMPTOMS: No  LUNG SYMPTOMS: Yes  INTESTINAL SYMPTOMS: No  URINARY SYMPTOMS: No  GYNECOLOGIC SYMPTOMS: Yes  BREAST SYMPTOMS: No  SKELETAL SYMPTOMS: No  BLOOD SYMPTOMS: No  NERVOUS SYSTEM SYMPTOMS: No  MENTAL HEALTH SYMPTOMS: Yes  Fever: No  Loss of appetite: No  Weight loss: No  Weight gain: No  Fatigue: Yes  Night sweats: No  Chills: No  Increased stress: Yes  Excessive hunger: No  Excessive thirst: No  Feeling hot or cold when others believe the temperature is normal: Yes  Loss of height: No  Post-operative complications: No  Surgical site pain: No  Hallucinations: No  Change in or Loss of Energy: No  Hyperactivity: No  Confusion: No  Cough: Yes  Sputum or phlegm: Yes  Coughing up blood: No  Difficulty breating or shortness of breath: No  Snoring: No  Wheezing: No  Difficulty breathing on exertion: No  Nighttime Cough: Yes  Difficulty breathing when lying flat: No  Bleeding or spotting between periods: No  Heavy or painful periods: No  Irregular periods: No  Vaginal discharge: No  Hot flashes: Yes  Vaginal dryness: No  Genital ulcers: No  Reduced libido: No  Painful intercourse: No  Difficulty with sexual arousal: No  Post-menopausal bleeding: No  Nervous or Anxious:  Yes  Depression: Yes  Trouble sleeping: No  Trouble thinking or concentrating: Yes  Mood changes: Yes  Panic attacks: No

## 2022-09-02 NOTE — PATIENT INSTRUCTIONS
Asthma -COPD overlap - clinically stable  Rx for prednisone and zpak as part of action plan.   Continue breo   Repeat PFTS this Fall (at your convenience)   Pulmonary nodule - will  repeat for CT

## 2022-09-10 ENCOUNTER — HEALTH MAINTENANCE LETTER (OUTPATIENT)
Age: 57
End: 2022-09-10

## 2023-06-03 ENCOUNTER — HEALTH MAINTENANCE LETTER (OUTPATIENT)
Age: 58
End: 2023-06-03

## 2023-07-18 DIAGNOSIS — J44.9 CHRONIC OBSTRUCTIVE PULMONARY DISEASE, UNSPECIFIED COPD TYPE (H): ICD-10-CM

## 2023-07-18 RX ORDER — ALBUTEROL SULFATE 90 UG/1
2 POWDER, METERED RESPIRATORY (INHALATION) EVERY 6 HOURS PRN
Qty: 3 EACH | Refills: 4 | Status: SHIPPED | OUTPATIENT
Start: 2023-07-18 | End: 2024-02-02

## 2023-07-18 NOTE — TELEPHONE ENCOUNTER
Medication:   albuterol (PROAIR RESPICLICK) 108 (90 Base) MCG/ACT inhaler 3 Inhaler 4 6/12/2020  --   Sig - Route: Inhale 2 puffs into the lungs every 6 hours as needed for shortness of breath / dyspnea or wheezing - Inhalation     Date last written: 6/12/20  Dispensed amount: 3 inhalers  Refills: 4      Requested Pharmacy: [facility] [id number]     Pt's last office visit: 9/2/22  Next scheduled office visit: 9/1/23      Per the RN/LPN medication refill protocol, writer is able to refill this request.

## 2023-07-19 ENCOUNTER — TELEPHONE (OUTPATIENT)
Dept: PULMONOLOGY | Facility: CLINIC | Age: 58
End: 2023-07-19
Payer: COMMERCIAL

## 2023-07-19 NOTE — TELEPHONE ENCOUNTER
Prior Authorization Retail Medication Request    Medication/Dose: ProAir Respiclick 108(90) MCG INH  ICD code (if different than what is on RX):    Previously Tried and Failed:    Rationale:      Insurance Name:    Insurance ID:        Pharmacy Information (if different than what is on RX)  Name:    Phone:

## 2023-07-21 NOTE — TELEPHONE ENCOUNTER
Central Prior Authorization Team   Phone: [phone]      PA Initiation    Medication: PROAIR RESPICLICK 108 (90 BASE) MCG/ACT IN AEPB  Insurance Company: [employer] - Phone [phone] Fax [phone]  Pharmacy Filling the Rx: [place], [place] - [facility] [address]  Filling Pharmacy Phone: [phone]  Filling Pharmacy Fax:    Start Date: 7/21/2023

## 2023-07-25 NOTE — TELEPHONE ENCOUNTER
PA Initiation    Medication: PROAIR RESPICLICK 108 (90 BASE) MCG/ACT IN AEPB  Insurance Company: [employer] - Phone [phone] Fax [phone]  Pharmacy Filling the Rx: [place], [place] - [facility] [address]  Filling Pharmacy Phone: [phone]  Filling Pharmacy Fax:    Start Date: 7/25/2023

## 2023-07-25 NOTE — TELEPHONE ENCOUNTER
Prior Authorization Approval        Medication: PROAIR RESPICLICK 108 (90 BASE) MCG/ACT IN AEPB  Authorization Effective Date: 6/25/2023  Authorization Expiration Date: 7/24/2024  Approved Dose/Quantity:   Reference #: [id number]   Insurance Company: [employer] - Phone [phone] Fax [phone]  Expected CoPay:       CoPay Card Available:      Financial Assistance Needed:   Which Pharmacy is filling the prescription: [place], [place] - [facility] [address]  Pharmacy Notified: Yes  Patient Notified: No

## 2023-08-25 ENCOUNTER — TELEPHONE (OUTPATIENT)
Dept: PULMONOLOGY | Facility: CLINIC | Age: 58
End: 2023-08-25
Payer: COMMERCIAL

## 2023-08-25 DIAGNOSIS — J44.9 CHRONIC OBSTRUCTIVE PULMONARY DISEASE, UNSPECIFIED COPD TYPE (H): ICD-10-CM

## 2023-08-28 RX ORDER — FLUTICASONE FUROATE AND VILANTEROL 200; 25 UG/1; UG/1
1 POWDER RESPIRATORY (INHALATION) DAILY
Qty: 60 EACH | Refills: 9 | Status: SHIPPED | OUTPATIENT
Start: 2023-08-28 | End: 2024-02-02

## 2023-08-28 NOTE — TELEPHONE ENCOUNTER
Medication:   fluticasone-vilanterol (BREO ELLIPTA) 200-25 MCG/INH inhaler 60 each 9 7/26/2022  No   Sig - Route: Inhale 1 puff into the lungs daily - Inhalation     Date last written: 7/26/22  Dispensed amount: 60  Refills: 9    Requested Pharmacy:   [facility] [id number]       Pt's last office visit: 9/2/22  Next scheduled office visit: 2/2/24

## 2023-12-20 NOTE — LETTER
Return to Work  2017     Seen today: yes    Patient:  Rosey Davalos  :   1965  MRN:     [id number]  Physician: PAUL BRUCE    Rosey Davalos may return to work on Date: 2017.      The next clinic appointment is scheduled for (date/time) 2017, 3:20 pm,     Patient limitations:  Unable to work after her next appointment for 1 week. No work  the 2017.  Work restrictions will be reviewed at her appointment on 2017.              Electronically signed by Paul Bruce DPM/mark             Subjective   Patient ID: Jae Levi is a 19 y.o. male who is here for concern of Follow-up (PERSONAL).    TANK Corrigan is here for information regarding gender affirming care.    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  He had mental health concerns that impacted his ability to get to class and complete the work.  He has been back home since late spring.    He has been seeing therapist Jordin Hogue PsyD - since ~ 8/2022; has a good relationship with him    Jae's mother is aware of his desire to be female; he is not yet comfortable sharing this with his father.  He lives with both; they are .    He called 's gender care program twice ~ noon each time, went straight to ; no return call.  He called [facility] but was concerned about cost and father finding out.  He had an appt at [facility], but they cancelled it because the doctor moved.     Objective   Temperature 36.9 °C (98.5 °F), temperature source Temporal, weight 60.7 kg (133 lb 12.8 oz).  Physical Exam  Constitutional:       Appearance: Normal appearance.   Pulmonary:      Effort: Pulmonary effort is normal.   Psychiatric:         Thought Content: Thought content normal.     Assessment/Plan   Problem List Items Addressed This Visit       Gender dysphoria in adult - Primary    Relevant Orders    Referral to LGBTQ+ and GenderCare Program   I offered Jae support in the care of his emotional and physical health and have referred him to the GenderCare Program at .  I would like to see him in a month for a routine physical.    He stated that he may not be able to continue to see Dr. Hogue for therapy because of insurance reasons.  I gave him the following to info to explore:  Sheryl Correia, PhD ([facility]) [phone]   Minna Uribe, PhD ([facility]) [phone]   [facility] ([facility]), [phone]   Ingrid Yip ([place]),  [phone]   Shazia Hardin, PhD ([facility]), [phone]

## 2024-02-02 ENCOUNTER — TELEPHONE (OUTPATIENT)
Dept: PULMONOLOGY | Facility: CLINIC | Age: 59
End: 2024-02-02

## 2024-02-02 ENCOUNTER — OFFICE VISIT (OUTPATIENT)
Dept: PULMONOLOGY | Facility: CLINIC | Age: 59
End: 2024-02-02
Attending: INTERNAL MEDICINE
Payer: COMMERCIAL

## 2024-02-02 VITALS
HEART RATE: 66 BPM | DIASTOLIC BLOOD PRESSURE: 66 MMHG | WEIGHT: 168 LBS | OXYGEN SATURATION: 100 % | HEIGHT: 66 IN | SYSTOLIC BLOOD PRESSURE: 128 MMHG | BODY MASS INDEX: 27 KG/M2

## 2024-02-02 DIAGNOSIS — J44.9 CHRONIC OBSTRUCTIVE PULMONARY DISEASE, UNSPECIFIED COPD TYPE (H): ICD-10-CM

## 2024-02-02 DIAGNOSIS — J44.89 ASTHMA-COPD OVERLAP SYNDROME (H): Primary | ICD-10-CM

## 2024-02-02 DIAGNOSIS — J44.1 COPD EXACERBATION (H): ICD-10-CM

## 2024-02-02 DIAGNOSIS — R91.8 PULMONARY NODULES: ICD-10-CM

## 2024-02-02 PROCEDURE — 99213 OFFICE O/P EST LOW 20 MIN: CPT | Performed by: INTERNAL MEDICINE

## 2024-02-02 PROCEDURE — 99214 OFFICE O/P EST MOD 30 MIN: CPT | Performed by: INTERNAL MEDICINE

## 2024-02-02 RX ORDER — FLUTICASONE FUROATE AND VILANTEROL 200; 25 UG/1; UG/1
1 POWDER RESPIRATORY (INHALATION) DAILY
Qty: 60 EACH | Refills: 9 | Status: SHIPPED | OUTPATIENT
Start: 2024-02-02

## 2024-02-02 RX ORDER — PREDNISONE 20 MG/1
20 TABLET ORAL DAILY
Qty: 10 TABLET | Refills: 0 | Status: SHIPPED | OUTPATIENT
Start: 2024-02-02

## 2024-02-02 RX ORDER — ALBUTEROL SULFATE 90 UG/1
2 POWDER, METERED RESPIRATORY (INHALATION) EVERY 6 HOURS PRN
Qty: 3 EACH | Refills: 4 | Status: SHIPPED | OUTPATIENT
Start: 2024-02-02

## 2024-02-02 RX ORDER — AZITHROMYCIN 250 MG/1
TABLET, FILM COATED ORAL
Qty: 6 TABLET | Refills: 0 | Status: SHIPPED | OUTPATIENT
Start: 2024-02-02

## 2024-02-02 NOTE — TELEPHONE ENCOUNTER
[facility] Call Center    Phone Message    May a detailed message be left on voicemail: yes     Reason for Call: Other: Pharmacy states on their end RX for    fluticasone-vilanterol (BREO ELLIPTA) 200-25 MCG/ACT inhaler    And  albuterol (PROAIR RESPICLICK) 108 (90 Base) MCG/ACT inhaler   Have been canceled. Is this an error? Please reach out pharmacy thank you    Action Taken: Other: PULM    Travel Screening: Not Applicable

## 2024-02-02 NOTE — PATIENT INSTRUCTIONS
Asthma/COPD overlap - clinically stable  2. Pulmonary nodules  3. Cough likely related to above    - refill for Breo and albuterol  - refilled action plan meds (zpak, prednisone)   - repeat PFT   - followup CT CHEST for nodule eval

## 2024-02-02 NOTE — PROGRESS NOTES
Pulmonary Follow-up     ID: 57 yo female hx of pulmonary nodules (last followed 2015), former smoker diagnosed with Asthma/COPD 2019 seen in Pulmonary clinic for follow-up. She was seen March 2021 (video) with stable symptoms and spirometry and more recently Sept 2022, who returns for a clinic visit.     PULM PROBLEM LIST  1. Asthma/COPD Overlap  2. Pulmonary Nodules (last CT 2015 , stable since 2013) , further surveillance discussed, deferring  3. Dry cough      INTERIM HISTORY    Overall has been doing well. Notes dyspnea with exertion persists and worse but notes related to back pain and deconditioning.  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   She endorses a dry cough, also unchanged but persistent worries her a bit.     Taking Breo consistently , denies flares of lung disease, denies ED department    Reviewed smoking history ~ 1-1.5 pk for 20 years quitting in 2004 though actually believes closer to 2000  ( ~30 pkyr - 17- 22 year since stopped)     CAT score : 12 (13 prior)   Cough 3, phlegm 1 tight 3 breath 2 activity 1 confidence 0, sleep 0, energy 2      Current Outpatient Medications   Medication    albuterol (PROAIR RESPICLICK) 108 (90 Base) MCG/ACT inhaler    Albuterol (VENTOLIN IN)    alpha-lipoic acid 100 MG capsule    ALPRAZolam (XANAX) 0.5 MG tablet    Amphetamine-Dextroamphetamine (ADDERALL PO)    Amphetamine-Dextroamphetamine (ADDERALL XR PO)    aspirin 81 MG tablet    azithromycin (ZITHROMAX) 250 MG tablet    Cetirizine HCl (ZYRTEC PO)    Fiber TABS    fluticasone-vilanterol (BREO ELLIPTA) 200-25 MCG/ACT inhaler    liraglutide (VICTOZA) 18 MG/3ML solution    losartan-hydrochlorothiazide (HYZAAR) 50-12.5 MG tablet    Melatonin 2.5 MG CHEW  "   metFORMIN (GLUCOPHAGE-XR) 500 MG 24 hr tablet    methylphenidate (RITALIN) 10 MG tablet    OMEPRAZOLE PO    order for DME    ORDER FOR DME    predniSONE (DELTASONE) 20 MG tablet    simvastatin (ZOCOR) 40 MG tablet    traZODone (DESYREL) 50 MG tablet    valACYclovir (VALTREX) 500 MG tablet    Venlafaxine HCl (EFFEXOR PO)     No current facility-administered medications for this visit.           Allergies   Allergen Reactions    Wellbutrin [Bupropion Hydrobromide] Other (See Comments)     Burning skin. Trav Brannon's syndrome.    Other [No Clinical Screening - See Comments] Nausea and Vomiting     Trulicity     Penicillins      Other reaction(s): Vaginal Irritation     EXAM  /66   Pulse 66   Ht 1.664 m (5' 5.5\")   Wt 76.2 kg (168 lb)   SpO2 100%   BMI 27.53 kg/m    Alert oriented no acute distress  Unlabored resp speaking in full sentences, clear breath sounds  RRR S1S2  Skin no obvious lesion tattoo over left ankle  Neuro grossly intact,   No peripheral edema   In good spirits      DATA  (No new imaging)       CXR 3/21 Heart and pulmonary vasculature within normal limits. The lungs are clear. Pleural spaces are clear. Cholecystectomy clips right upper quadrant.     CT From 2015 Heart size is normal. No pericardial effusion.  Normal thoracic vasculature. No thoracic lymphadenopathy. Central tracheobronchial tree is patent. No pleural effusion or pneumothorax. Emphysematous changes.Resolution of previously seen groundglass opacities in the right lung. 2 mm pulmonary nodule in the right upper lobe (series 4, image 65), unchanged since 11/20/2013 2 mm pulmonary nodule in the right lower lobe (series 4, image 166), unchanged since 11/20/2013 2 mm pulmonary nodule in the right middle lobe (series 4, image 147), new     Scattered calcified granulomas are seen. Bones and soft tissues: No suspicious bone findings. Mild degenerative findings the thoracic spine.     Partially imaged upper abdomen: Limited. Post " cholecystectomy. Hypodense lesion in the liver too small to characterize.    Normal PFTs    IMPRESSION/PLAN  1. Asthma/COPD overlap, - clinically stable , noted mild dry cough;   2. Emphysema (on CT,)   3. Pulmonary nodules - discussed repeat surveillance and rationale (nodules usually asymptomatic) - last nodule 2mm 2015,  will repeat CT after discussion today    - refill for Breo and albuterol  - refilled action plan meds (zpak, prednisone)   - repeat PFT   - followup CT CHEST for nodule maria isabelal     Frankie Pandey MD  A total of 32 min spent on the encounter today regarding the issues above

## 2024-02-02 NOTE — LETTER
2/2/2024         RE: Rosey Davalos  [address]  [place] [address]        Dear Colleague,    Thank you for referring your patient, Rosey Davalos, to the [facility] [place]. Please see a copy of my visit note below.    Pulmonary Follow-up     ID: 57 yo female hx of pulmonary nodules (last followed 2015), former smoker diagnosed with Asthma/COPD 2019 seen in Pulmonary clinic for follow-up. She was seen March 2021 (video) with stable symptoms and spirometry and more recently Sept 2022, who returns for a clinic visit.     PULM PROBLEM LIST  1. Asthma/COPD Overlap  2. Pulmonary Nodules (last CT 2015 , stable since 2013) , further surveillance discussed, deferring  3. Dry cough      INTERIM HISTORY    Overall has been doing well. Notes dyspnea with exertion persists and worse but notes related to back pain and deconditioning.  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   She endorses a dry cough, also unchanged but persistent worries her a bit.     Taking Breo consistently , denies flares of lung disease, denies ED department    Reviewed smoking history ~ 1-1.5 pk for 20 years quitting in 2004 though actually believes closer to 2000  ( ~30 pkyr - 17- 22 year since stopped)     CAT score : 12 (13 prior)   Cough 3, phlegm 1 tight 3 breath 2 activity 1 confidence 0, sleep 0, energy 2      Current Outpatient Medications   Medication     albuterol (PROAIR RESPICLICK) 108 (90 Base) MCG/ACT inhaler     Albuterol (VENTOLIN IN)     alpha-lipoic acid 100 MG capsule     ALPRAZolam (XANAX) 0.5 MG tablet     Amphetamine-Dextroamphetamine (ADDERALL PO)     Amphetamine-Dextroamphetamine (ADDERALL XR PO)      "aspirin 81 MG tablet     azithromycin (ZITHROMAX) 250 MG tablet     Cetirizine HCl (ZYRTEC PO)     Fiber TABS     fluticasone-vilanterol (BREO ELLIPTA) 200-25 MCG/ACT inhaler     liraglutide (VICTOZA) 18 MG/3ML solution     losartan-hydrochlorothiazide (HYZAAR) 50-12.5 MG tablet     Melatonin 2.5 MG CHEW     metFORMIN (GLUCOPHAGE-XR) 500 MG 24 hr tablet     methylphenidate (RITALIN) 10 MG tablet     OMEPRAZOLE PO     order for DME     ORDER FOR DME     predniSONE (DELTASONE) 20 MG tablet     simvastatin (ZOCOR) 40 MG tablet     traZODone (DESYREL) 50 MG tablet     valACYclovir (VALTREX) 500 MG tablet     Venlafaxine HCl (EFFEXOR PO)     No current facility-administered medications for this visit.           Allergies   Allergen Reactions     Wellbutrin [Bupropion Hydrobromide] Other (See Comments)     Burning skin. Trav Brannon's syndrome.     Other [No Clinical Screening - See Comments] Nausea and Vomiting     Trulicity      Penicillins      Other reaction(s): Vaginal Irritation     EXAM  /66   Pulse 66   Ht 1.664 m (5' 5.5\")   Wt 76.2 kg (168 lb)   SpO2 100%   BMI 27.53 kg/m    Alert oriented no acute distress  Unlabored resp speaking in full sentences, clear breath sounds  RRR S1S2  Skin no obvious lesion tattoo over left ankle  Neuro grossly intact,   No peripheral edema   In good spirits      DATA  (No new imaging)       CXR 3/21 Heart and pulmonary vasculature within normal limits. The lungs are clear. Pleural spaces are clear. Cholecystectomy clips right upper quadrant.     CT From 2015 Heart size is normal. No pericardial effusion.  Normal thoracic vasculature. No thoracic lymphadenopathy. Central tracheobronchial tree is patent. No pleural effusion or pneumothorax. Emphysematous changes.Resolution of previously seen groundglass opacities in the right lung. 2 mm pulmonary nodule in the right upper lobe (series 4, image 65), unchanged since 11/20/2013 2 mm pulmonary nodule in the right lower lobe " (series 4, image 166), unchanged since 11/20/2013 2 mm pulmonary nodule in the right middle lobe (series 4, image 147), new     Scattered calcified granulomas are seen. Bones and soft tissues: No suspicious bone findings. Mild degenerative findings the thoracic spine.     Partially imaged upper abdomen: Limited. Post cholecystectomy. Hypodense lesion in the liver too small to characterize.    Normal PFTs    IMPRESSION/PLAN  1. Asthma/COPD overlap, - clinically stable , noted mild dry cough;   2. Emphysema (on CT,)   3. Pulmonary nodules - discussed repeat surveillance and rationale (nodules usually asymptomatic) - last nodule 2mm 2015,  will repeat CT after discussion today    - refill for Breo and albuterol  - refilled action plan meds (zpak, prednisone)   - repeat PFT   - followup CT CHEST for nodule eval     Frankie Pandey MD  A total of 32 min spent on the encounter today regarding the issues above       Again, thank you for allowing me to participate in the care of your patient.        Sincerely,        Frankie Pandey MD

## 2024-02-02 NOTE — TELEPHONE ENCOUNTER
Writer called to follow up on call. Pharmacist wondering if medications just ordered by Dr. Pandey were then cancelled. Writer does not see anything on our end as to why they would be canceled. Pharmacist just making sure we still want medications Breo and albuterol for patient.

## 2024-02-02 NOTE — NURSING NOTE
Chief Complaint   Patient presents with    Follow Up     Copd      Cyrus Dietz, JOHN CMA at 8:55 AM on 2/2/2024

## 2024-02-20 ENCOUNTER — OFFICE VISIT (OUTPATIENT)
Dept: PULMONOLOGY | Facility: CLINIC | Age: 59
End: 2024-02-20
Attending: INTERNAL MEDICINE
Payer: COMMERCIAL

## 2024-02-20 ENCOUNTER — ANCILLARY PROCEDURE (OUTPATIENT)
Dept: CT IMAGING | Facility: CLINIC | Age: 59
End: 2024-02-20
Attending: INTERNAL MEDICINE
Payer: COMMERCIAL

## 2024-02-20 DIAGNOSIS — J44.9 CHRONIC OBSTRUCTIVE PULMONARY DISEASE, UNSPECIFIED COPD TYPE (H): ICD-10-CM

## 2024-02-20 DIAGNOSIS — J44.89 ASTHMA-COPD OVERLAP SYNDROME (H): ICD-10-CM

## 2024-02-20 PROCEDURE — 94726 PLETHYSMOGRAPHY LUNG VOLUMES: CPT | Performed by: INTERNAL MEDICINE

## 2024-02-20 PROCEDURE — 71250 CT THORAX DX C-: CPT | Performed by: RADIOLOGY

## 2024-02-20 PROCEDURE — 94375 RESPIRATORY FLOW VOLUME LOOP: CPT | Performed by: INTERNAL MEDICINE

## 2024-02-20 PROCEDURE — 94729 DIFFUSING CAPACITY: CPT | Performed by: INTERNAL MEDICINE

## 2024-02-21 LAB
DLCOUNC-%PRED-PRE: 130 %
DLCOUNC-PRE: 27.31 ML/MIN/MMHG
DLCOUNC-PRED: 20.86 ML/MIN/MMHG
ERV-%PRED-PRE: 28 %
ERV-PRE: 0.33 L
ERV-PRED: 1.18 L
EXPTIME-PRE: 7.34 SEC
FEF2575-%PRED-PRE: 85 %
FEF2575-PRE: 1.97 L/SEC
FEF2575-PRED: 2.3 L/SEC
FEFMAX-%PRED-PRE: 98 %
FEFMAX-PRE: 6.54 L/SEC
FEFMAX-PRED: 6.66 L/SEC
FEV1-%PRED-PRE: 95 %
FEV1-PRE: 2.41 L
FEV1FEV6-PRE: 77 %
FEV1FEV6-PRED: 81 %
FEV1FVC-PRE: 77 %
FEV1FVC-PRED: 80 %
FEV1SVC-PRE: 72 %
FEV1SVC-PRED: 72 %
FIFMAX-PRE: 5.43 L/SEC
FRCPLETH-%PRED-PRE: 92 %
FRCPLETH-PRE: 2.61 L
FRCPLETH-PRED: 2.81 L
FVC-%PRED-PRE: 98 %
FVC-PRE: 3.12 L
FVC-PRED: 3.16 L
IC-%PRED-PRE: 128 %
IC-PRE: 3.03 L
IC-PRED: 2.37 L
RVPLETH-%PRED-PRE: 115 %
RVPLETH-PRE: 2.28 L
RVPLETH-PRED: 1.98 L
TLCPLETH-%PRED-PRE: 106 %
TLCPLETH-PRE: 5.64 L
TLCPLETH-PRED: 5.27 L
VA-%PRED-PRE: 94 %
VA-PRE: 4.75 L
VC-%PRED-PRE: 96 %
VC-PRE: 3.36 L
VC-PRED: 3.47 L

## 2024-03-11 ENCOUNTER — MYC MEDICAL ADVICE (OUTPATIENT)
Dept: PULMONOLOGY | Facility: CLINIC | Age: 59
End: 2024-03-11
Payer: COMMERCIAL

## 2024-04-28 ENCOUNTER — HEALTH MAINTENANCE LETTER (OUTPATIENT)
Age: 59
End: 2024-04-28

## 2024-07-07 ENCOUNTER — HEALTH MAINTENANCE LETTER (OUTPATIENT)
Age: 59
End: 2024-07-07

## 2025-04-07 ENCOUNTER — ANCILLARY PROCEDURE (OUTPATIENT)
Dept: CARDIOLOGY | Facility: CLINIC | Age: 60
End: 2025-04-07
Attending: INTERNAL MEDICINE
Payer: COMMERCIAL

## 2025-04-07 ENCOUNTER — LAB (OUTPATIENT)
Dept: LAB | Facility: CLINIC | Age: 60
End: 2025-04-07
Payer: COMMERCIAL

## 2025-04-07 DIAGNOSIS — J44.89 ASTHMA-COPD OVERLAP SYNDROME (H): ICD-10-CM

## 2025-04-07 DIAGNOSIS — R06.09 DYSPNEA ON EXERTION: ICD-10-CM

## 2025-04-07 LAB
ANION GAP SERPL CALCULATED.3IONS-SCNC: 9 MMOL/L (ref 7–15)
BASE EXCESS BLDV CALC-SCNC: 6.6 MMOL/L (ref -3–3)
BI-PLANE LVEF ECHO: NORMAL
BUN SERPL-MCNC: 9.9 MG/DL (ref 8–23)
CALCIUM SERPL-MCNC: 9.7 MG/DL (ref 8.8–10.4)
CHLORIDE SERPL-SCNC: 100 MMOL/L (ref 98–107)
CREAT SERPL-MCNC: 0.78 MG/DL (ref 0.51–0.95)
EGFRCR SERPLBLD CKD-EPI 2021: 86 ML/MIN/1.73M2
GLUCOSE SERPL-MCNC: 118 MG/DL (ref 70–99)
HCO3 BLDV-SCNC: 34 MMOL/L (ref 21–28)
HCO3 SERPL-SCNC: 31 MMOL/L (ref 22–29)
LVEF ECHO: NORMAL
O2/TOTAL GAS SETTING VFR VENT: 21 %
OXYHGB MFR BLDV: 24 % (ref 70–75)
PCO2 BLDV: 61 MM HG (ref 40–50)
PH BLDV: 7.36 [PH] (ref 7.32–7.43)
PO2 BLDV: 18 MM HG (ref 25–47)
POTASSIUM SERPL-SCNC: 3.9 MMOL/L (ref 3.4–5.3)
SAO2 % BLDV: 24 % (ref 70–75)
SODIUM SERPL-SCNC: 140 MMOL/L (ref 135–145)

## 2025-04-07 PROCEDURE — 80048 BASIC METABOLIC PNL TOTAL CA: CPT | Performed by: PATHOLOGY

## 2025-04-07 PROCEDURE — 36600 WITHDRAWAL OF ARTERIAL BLOOD: CPT | Performed by: PATHOLOGY

## 2025-04-07 PROCEDURE — 93306 TTE W/DOPPLER COMPLETE: CPT | Performed by: INTERNAL MEDICINE

## 2025-04-07 PROCEDURE — 94729 DIFFUSING CAPACITY: CPT | Performed by: INTERNAL MEDICINE

## 2025-04-07 PROCEDURE — 82805 BLOOD GASES W/O2 SATURATION: CPT | Performed by: PATHOLOGY

## 2025-04-07 PROCEDURE — 94375 RESPIRATORY FLOW VOLUME LOOP: CPT | Performed by: INTERNAL MEDICINE

## 2025-04-08 LAB
DLCOUNC-%PRED-PRE: 114 %
DLCOUNC-PRE: 23.86 ML/MIN/MMHG
DLCOUNC-PRED: 20.77 ML/MIN/MMHG
ERV-%PRED-PRE: 20 %
ERV-PRE: 0.24 L
ERV-PRED: 1.17 L
EXPTIME-PRE: 8.87 SEC
FEF2575-%PRED-PRE: 83 %
FEF2575-PRE: 1.89 L/SEC
FEF2575-PRED: 2.25 L/SEC
FEFMAX-%PRED-PRE: 91 %
FEFMAX-PRE: 6.02 L/SEC
FEFMAX-PRED: 6.6 L/SEC
FEV1-%PRED-PRE: 93 %
FEV1-PRE: 2.31 L
FEV1FEV6-PRE: 76 %
FEV1FEV6-PRED: 81 %
FEV1FVC-PRE: 76 %
FEV1FVC-PRED: 80 %
FEV1SVC-PRE: 81 %
FEV1SVC-PRED: 72 %
FIFMAX-PRE: 6.76 L/SEC
FVC-%PRED-PRE: 97 %
FVC-PRE: 3.04 L
FVC-PRED: 3.12 L
IC-%PRED-PRE: 112 %
IC-PRE: 2.63 L
IC-PRED: 2.35 L
VA-%PRED-PRE: 88 %
VA-PRE: 4.45 L
VC-%PRED-PRE: 83 %
VC-PRE: 2.87 L
VC-PRED: 3.45 L

## 2025-04-15 ENCOUNTER — ALLIED HEALTH/NURSE VISIT (OUTPATIENT)
Dept: RESEARCH | Facility: CLINIC | Age: 60
End: 2025-04-15
Payer: COMMERCIAL

## 2025-04-15 VITALS
HEART RATE: 97 BPM | HEIGHT: 65 IN | RESPIRATION RATE: 20 BRPM | BODY MASS INDEX: 29.16 KG/M2 | TEMPERATURE: 98.2 F | WEIGHT: 175 LBS | DIASTOLIC BLOOD PRESSURE: 84 MMHG | OXYGEN SATURATION: 99 % | SYSTOLIC BLOOD PRESSURE: 127 MMHG

## 2025-04-15 DIAGNOSIS — Z00.6 RESEARCH SUBJECT: ICD-10-CM

## 2025-04-15 DIAGNOSIS — Z00.6 EXAMINATION OF PARTICIPANT OR CONTROL IN CLINICAL RESEARCH: Primary | ICD-10-CM

## 2025-04-15 NOTE — PROGRESS NOTES
[place] Study Physical Exam  Study Description: The purpose of this study is to explore potential relationships between physiologic parameters collected from sensor data with physiological changes potentially induced by the administration of the COVID-19 vaccine.     Medical History Reviewed? Yes  After extensive review of the entire available medical record, to the best of my knowledge there is no reason to exclude this patient from the study.     Medical Decision Making (include details from chart review, discussion with Dr. IBARRA, etc): Patient with DM 2, well controlled, AIC 7.1. Her anxiety is currently well controlled both with medications and coping mechanisms. Reports only using xanax 1/2 tab, maybe 1-2 times per month. Restless legs have resolved as her anxiety is under control.  Her vomiting episodes have resolved with discontinuation of trulicity.  She states that these can also be related to anxiety, and she does have zofran on hand, but hasn't used any for over a year.  Her COPD is well managed. She has not required any antibiotics or steroids for flares. She did get prescribed prednisone in Feb. 2025, she states she did not take this and this is only to have on hand. She always call her doctor before starting it. She does have ERNIE, is compliant 100% of the time with it.  Reports sleeping very well, averaging 8-10 hours per night.  She does take 100 mg of trazadone and 10 mg of melatonin nightly.  There is mention of constipation. She states this is mostly around surgeries. She has used dulcolax around these times, as she had had multiple surgeries. States she has not used any laxatives for many years.     Physical Examination  For abnormal findings, please evaluate if the finding is Clinically Significant (by 'CS') or Not Clinically Significant (by 'NCS')  General Appearance   Normal  Head and Neck   Normal  Lungs     Normal  Cardiovascular   Normal   Do they have a stimulator/Pacemaker?  "No  Gastrointestinal   Normal   Problems swallowing medication? No  ANY history of diverticula (diverticulosis, diverticulitis, etc): No  Any history of GI surgery? Yes: cholecystectomy; not exclusionary  Bowel habits: Regular, every day 1-2 times, drinks 50-60 oz water daily; works fulltime and walks daily.   Regular laxative use? No  Musculoskeletal/Extremities Normal   Lymph Nodes    Normal  Skin     Abnormal; NCS tattoo top of left foot and ankle; freestyle kristan on back of right upper arm      Any Tattoos or Skin issues on the wrists or deltoid? No  Neurological    Normal   Any sleep disturbances? (Must get at least 5 hours a night) No   Memory issues?  No    Tremor (If present document)  Absent  Any balance issues or recent falls?     No    Vitals:    04/15/25 0927   BP: 127/84   BP Location: Left arm   Patient Position: Sitting   Cuff Size: Adult Large   Pulse: 97   Resp: 20   Temp: 98.2  F (36.8  C)   TempSrc: Oral   SpO2: 99%   Weight: 79.4 kg (175 lb)   Height: 1.651 m (5' 5\")             Immunization History   Administered Date(s) Administered    COVID-19 Monovalent 18+ (Moderna) 03/23/2021, 04/20/2021, 12/15/2021    Influenza (IIV3) PF 11/20/2013    Pneumo Conj 13-V (2010&after) 05/27/2015    Pneumococcal 23 valent 11/20/2013       Reminders:  Are they using prescription pain meds? No  Any first degree relatives with inflammatory bowel disease? (Crohn's, ulcerative colitis, etc) No  Any serious medical issues that require treatment and evaluation? No   Any conditions they are following closely with their PCP? No    Have you had any serious issues with previous Covid-19 immunizations? No  COVID Vaccine Screening   Have you received a dose of the Covid-19 vaccine before?   Yes, Moderna  Date of most recent Covid-19 vaccine dose:     15-December-2021   Do you currently have a health condition or are undergoing    treatment that makes you moderately to severely immunocompromised?* No  Have you ever had an " allergic reaction to a Covid vaccine?**  No  Have you ever had an allergic reaction to another vaccine or injectable  medication?         No  Have you ever felt dizzy or faint before, during or after a shot?   No    *Ex: treatment of cancer, HIV, organ transplant recipient, immunosuppressive therapy, etc.     **This would include a severe allergic reaction (e.g., anaphylaxis that required treatment with epinephrine or caused you to go to the hospital. It would also include an allergic reaction that caused hives, swelling, or respiratory distress, including wheezing)    Is this subject eligible to receive a Covid-19 vaccine? Yes    Patient does fulfill study inclusion criteria and no exclusion criteria are found. Subject will continue in the study. This decision was made at 11:24    15-APR-2025    Kathe Duke PA-C

## 2025-04-15 NOTE — PROGRESS NOTES
[place] Screening Study Note  Study Description: The purpose of this study is to explore potential relationships between physiologic parameters collected from sensor data with physiological changes potentially induced by the administration of the COVID-19 vaccine.       Subject ID:      Demographic Info  Rosey Davalos   1965          60 year old    SCREENING   Sex: Female   Pregnancy Screening:   Surgically Sterile: Yes   Over 55 years of age and have not had a menstrual cycle for >2 years: Yes        Multi Racial?: No; Primary: White   Ethnicity: Not  or      MEDICAL HISTORY REVIEW  Medical history, medications, allergies, surgical history and familial medical history were reviewed with the participant and verified by chart review.     Medical Conditions:   The table below represents their relevant medical history.   Has the subject experienced any relevant past and/or concomitant Medical History (e.g., chronic conditions such as hypertension, cardiovascular disease, stroke, diabetes, kidney disease, peripheral arterial disease, Raynaud's syndrome? Yes  Past Medical History:   Diagnosis Date    COPD (chronic obstructive pulmonary disease) (H)     on ICS/LABA. A1AT enma normal.    Depression     ERNIE on CPAP        Condition Ongoing? Start Date (MM/DD/YYYY) End Date (if applicable)   Obstructive Sleep Apnea Yes 20-Nov-2013 No   Depression  Yes 14-Jan-2017 No   Anxiety Yes 27-Oct-2014 No   PTSD  Yes 19-Jul-2017 No   Type 2 Diabetes Mellitus  Yes 02-May-2017 No   Chronic obstructive pulmonary disease  Yes 09-Sep-2013 No   Pulmonary Hypertension Yes 26-Feb-2014 No   GERD Yes 19-Jul-2017 No   High Cholesterol Yes 04-Mar-2018 No   ADHD Yes 19-Jul-2017 No   Asthma Yes 09-Sep-2013 No   Herpes simplex type 1 Yes 14-Jan-2016 No       Any History of...  If any of the below are yes, the subject is a screen fail.  -Divertic___ (diverticulosis, diverticula, diverticulitis, etc.)? No  -Rheumatoid  arthritis? No  Lupus? No  Hernia? (Other than inguinal or childhood umbilical)  No  Peptic Ulcer? No  Crohn's Disease? No  In any 1st degree family members? No  Colitis? No  Dysphagia? No  Parkinson s? No  Essential Tremor? No      Concomitant Medications:   The table below represents their current prescription, OTC, and supplement medications they are taking on a regular basis/have taken in the last 30 days.      Medication Name (Generic) Class Start Date (MM/DD/YYYY) Ongoing? Dose Unit Frequency Route Indication   ADDERALL EX  Other 25-May-2016 Yes  30 mg QD Oral Con Med Cond: ADHD   ADDERALL Other 19-Jul-2017 Yes 10 mg PRN Oral Con Med Cond: ADHD   ALPRAZolam Other 20-Nov-2013 Yes 0.5 mg PRN Oral Con Med Cond: Anxiety   aspirin  Other 30-Aug-2017 Yes 81 mg QD Oral Prophylaxis   Desvenlafaxine Antidepressant 28-Apr-2023 Yes 150 mg QD Oral Con Med Cond: PTSD   fluticasone-vilanterol  Other 12-Jun-2020 Yes 1 Puff QD Respiratory (Inhalation) Con Med Cond: Chronic obstructive pulmonary disease   losartan Other 11-Jul-2019 Yes 50 mg QD Oral Con Med Cond:  Pulmonary Hypertension   hydrochlorothiazide Other 11-Jul-2019 Yes 12.5 mg QD Oral Con Med Cond:  Pulmonary Hypertension   metFORMIN Anti-diabetic 11-Mar-2020 Yes 1000 mg BID Oral Con Med Cond: Type 2 Diabetes Mellitus   methylphenidate  Other 22-Jul-2022 Yes 10 mg QD Oral Con Med Cond: ADHD   Omeprazole Other 20-Nov-2013 Yes 20 mg QD Oral Con Med Cond: GERD     Ozempic Anti-diabetic 01-Dec-2024 Yes 0.5 mg Other: every 7 days Subcutaneous Con Med Cond: Type 2 Diabetes Mellitus   albuterol Other 12-Jun-2020 Yes 2 Puff PRN Respiratory (Inhalation) Con Med Cond: Chronic obstructive pulmonary disease   simvastatin Other 04-Jun-2019 Yes 40 mg QD Oral Con Med Cond: High Cholesterol   traZODone Antidepressant 23-Aug-2022 Yes 50 mg QD Oral Con Med Cond: PTSD   valACYclovir Other 01-Jul-2008 Yes 500 mg PRN Oral Con Med Cond: Herpes simplex type 1   Zofran Other 11-Jul-2019 Yes  "4 mg PRN Oral Con Med Cond: PTSD   ibuprofen Non-steroidal Anti-inflammatory (NSAID) 01-Oct-2022 Yes 200 mg PRN Oral Prophylaxis   FreeStyle Aaron 3 Sensor Anti-diabetic 11-Jul-2019 Yes 1 Other Dose Unit, specify Sensor Other: EVERY 14 DAYS  Subcutaneous Con Med Cond: Type 2 Diabetes Mellitus       Allergies:   Does the subject have any known allergies to medications, food, a vaccine component, or latex? Yes  *If the participant has an allergy to something in not one of these 3 categories, include them in the medical history.*  Allergies   Allergen Reactions    Wellbutrin [Bupropion Hydrobromide] Other (See Comments)     Burning skin. Trav Brannon's syndrome.    Other [No Clinical Screening - See Comments] Nausea and Vomiting     Trulicity     Penicillins      Other reaction(s): Vaginal Irritation        Surgical History  Any history of gastrointestinal tract surgery involving the esophagus, stomach, or intestines? No  If there are no GI surgeries, delete surgery smartlist section.     No past surgical history on file.    Family Medical History  No first degree relative has a history of any inflammatory bowel disease with suspected hereditary transmission (eg. Crohn's, ulcerative colitis, etc)     Family History   Problem Relation Age of Onset    Depression Mother     Alcohol/Drug Mother     Alcohol/Drug Father     Depression Father     Alcohol/Drug Sister     Depression Sister     Heart Disease Paternal Grandfather     Cancer Paternal Grandmother         Stomach cancer    Diabetes Paternal Aunt        Subject Characteristics     Vitals  /84 (BP Location: Left arm, Patient Position: Sitting, Cuff Size: Adult Large)   Pulse 97   Temp 98.2  F (36.8  C) (Oral)   Resp 20   Ht 1.651 m (5' 5\")   Wt 79.4 kg (175 lb)   SpO2 99%   BMI 29.12 kg/m         Hancock Scale:  Wrist Circumference: Study Watch Wrist Preferred Watch Wrist Dominant Hand Watch Band Tightness:   1 16 cm Left Left Right Secure    "   Watch Size Preference: 41mm    ENROLLMENT    Was the visit performed? Yes   Date of Enrollment: 15-APR-2025. All procedures below occurred on this day.    Site Zip Code: Site Time Zone:  Site Location: Protocol Assigned: Eligibility Confirmed:   [address] [place] Protocol A (COVID) Yes   Plan for Study Kit #1 Delivery: Given to Participant On-Site     [place] Device Accountability Prepped/Dispensed  Prepped & Dispensed Study Kit #1:  All Study Devices included? Yes (including Study Watch, Study Phone, Ambient Sensor, Oral Thermometer and Charging Accessories)  Is this a Replacement Kit? No    Study Phone  ID [employer] Research ID [place] ID    [id number]     Study Watch  ID Model  Band Type    [id number] Series 9 Sport Loop     Was Study Kit #1 Shipped to the Participant? No  Were all expected devices received? Yes  Were there Device Issues? No  Was the Study Kit Replaced? No    All devices listed above were dispensed to the participant. Device ID were confirmed prior to dispensation.      Participant was thoroughly educated on study procedure and device care, staff highlighted the importance of compliance to study procedure. All questions and concerns were addressed, and informed participant to contact study coordinators for any questions. Subject was provided with a copy of the subject instructions for at home review.     Adverse Events Summary:*   Were any Adverse Events (AEs) experienced? No    Protocol Deviation Summary:*   Were there any Protocol Deviations?: No    *If Yes, please complete corresponding form.    Concomitant Medications:  As screening and enrollment appointments occurred on the same day, please refer to the concomitant medications documented above.        15-APR-2025   Clifton Higgins

## 2025-04-15 NOTE — PROGRESS NOTES
[place] Study Consent    Study Description: The purpose of this study is to explore potential relationships between physiologic parameters collected from sensor data with physiological changes potentially induced by the administration of the COVID-19 vaccine.    Rosey Davalos a 60 year old female, was on-site today at [facility] to discuss participation for [place] (-[id number])       The consent form was reviewed with the patient.     The review of the study included:  Study Purpose      Participant Duration, Responsibilities & Expectations    Study Data and Devices    Benefits and Risks of Participation    Compensation and Costs of Participation    Coded Study Data  Voluntary Participation    Study Restrictions  Confidentiality Obligations/Privacy-Related Risks   Injury, Legal, and Data Rights    Authorization to Use and Disclose Your Protected Health Information    Protocol Version: 6.0   Principle Investigator: Prasanth Hong MD    Subject Number: [id number]      The subject was queried in regards to her willingness to continue and her questions were answered to her satisfaction. The patient has given her agreement to volunteer and participate in the above noted study.     The eConsent and HIPAA form version (Version 6.0 Date 03-Apr-2025) was signed on  15-APR-2025 with the Clinical Research Unit of [facility].     A copy of the [place] consent will be placed in subject's medical record. A copy of the consent form was provided to the subject today.    Study data is directly entered into Epic and [facility] per protocol. No study procedures were done prior to Rosey Davalos providing informed consent.       15-APR-2025    Clifton Higgins

## 2025-05-05 ENCOUNTER — VIRTUAL VISIT (OUTPATIENT)
Dept: RESEARCH | Facility: CLINIC | Age: 60
End: 2025-05-05
Payer: COMMERCIAL

## 2025-05-05 DIAGNOSIS — Z00.6 RESEARCH SUBJECT: Primary | ICD-10-CM

## 2025-05-05 PROCEDURE — 99207 PR NO CHARGE NURSE ONLY: CPT | Mod: 93

## 2025-05-05 NOTE — PROGRESS NOTES
[place] Pre-Pill Day Call/Virtual Visit 1 Study Note    Study Description: The purpose of this study is to explore potential relationships between physiologic parameters collected from sensor data with physiological changes potentially induced by the administration of the COVID-19 vaccine.       Subject ID: [id number]     Virtual Check In Visit 1  Time of Visit (24H): 09:09    Date/Time of Onsite Visit 1 / Pill Appointment Confirmed? Yes    Has this subject completed 4 consecutive compliant days? Yes, proceed with Onsite Visit 1    Reminders  [x] Check compliance and address any issues  [x] Are you having any issues with your study devices? No  [x] Continue to follow nightly/daily study procedures   [x] Please bring your Study Watch, Study Phone, and Subject Instructions to your next appointment.   [x] Adhere to the lifestyle considerations listed in inclusion criteria number 7 (NSAIDs, Recreational drug use, strenuous exercise etc):    Particularly, REFRAIN from NSAID usage during the upcoming ingestion phase. The participant verbalized their understanding.     Additional Notes: N/A      Adverse Events Summary:*   Were any Adverse Events (AEs) experienced?  Yes    Protocol Deviation Summary:*   Were there any Protocol Deviations?:  No    *If Yes, please complete corresponding form.    Concomitant Medications Summary:    Has the subject taken any medications within 30 days prior to signing the informed consent and/or during the study? (Have they started any new medications since their last appointment?) Yes, and there have been changes to their medication. Please see AE Note.    05-MAY-2025     Nelli Brady

## 2025-05-06 ENCOUNTER — ALLIED HEALTH/NURSE VISIT (OUTPATIENT)
Dept: RESEARCH | Facility: CLINIC | Age: 60
End: 2025-05-06
Payer: COMMERCIAL

## 2025-05-06 DIAGNOSIS — Z00.6 EXAMINATION OF PARTICIPANT OR CONTROL IN CLINICAL RESEARCH: Primary | ICD-10-CM

## 2025-05-06 PROCEDURE — 99207 PR NO CHARGE-RESEARCH SERVICE: CPT

## 2025-05-06 NOTE — PROGRESS NOTES
[place] Adverse Event Note  Study Description: The purpose of this study is to explore potential relationships between physiologic parameters collected from sensor data with physiological changes potentially induced by the administration of the COVID-19 vaccine.     Subject ID:      Were any Adverse Events (AEs) experienced? Yes    AE: Sinus Infection     AE Start Date: 03-MAY- 2025   Ongoing  Writer aware [id number]    Severity: Grade 1: Mild    Action Taken: None  Outcome: Ongoing or stabilized and followed by private healthcare provider      Relationship to Study Device: Not Related    Did the AE cause the subject to be discontinued from the study?  No   Is the AE serious?  No   Serious Criteria N/A   Is the AE an Unanticipated Problem (UAP)? No   Is this an Unanticipated Adverse Device Effect (UADE)?  No       Date CM or PI was notified of AE/MIKKI, UAP, or UADE:   06-May-2025   Date AE/MIKKI, UAP, or UADE reported to Sponsor:  06-May-2025   Date MIKKI, UAP, or UADE reported to IRB:  N/A     Concomitant/additional treatment given? Yes, and there have been changes to their medication. Please see updates medications below   Medication Name (Generic) Class Start Date Ongoing? Dose Unit Frequency Route Indication   Azithromycin  Other 03-MAY-2025 Yes 250  mg Other: Take 2 pills for one day, and 1 pill for four days. Oral Con Med Cond: Sinus Infection         Additional AE Details: Has reoccurring sinus infections with a  standing order for azithromycin. Participant states feeling better    Causality:  Dr. Hong: Please provide your assessment of relationship     Was the adverse event related to the device? [] Yes   [x] No   [] Unknown       AE Severity: [x] Mild  [] Moderate  [] Severe       Relationship to study procedures: [] Definitely related  [] Possibly related  [] Unlikely related  [x] Not related       Action taken with study procedures: [x] None  [] Study procedures interrupted  [] Study  procedures stopped         06-May-2025     Sandra Grey RN

## 2025-05-06 NOTE — PROGRESS NOTES
[place] Pill Day / On-Site 1 Note  Study Description: The purpose of this study is to explore potential relationships between physiologic parameters collected from sensor data with physiological changes potentially induced by the administration of the COVID-19 vaccine.    Subject ID: [id number]     Rosey Davalos presents to [facility] for On-Site 1 on 06-MAY-2025. All procedures below occurred on this date.     On-Site Visit 1                                                                Was the visit performed? Yes  Height/Weight and BMI confirmed? Yes  Repeat Height/Weight and BMI  There were no vitals taken for this visit.   Note: Capture of the values of Height/Weight and BMI confirmation have no bearing on recruitment binning or usable data binning. The participant will remain in their bin as assigned at screening/enrollment. Sponsor requested this information to be captured but not input in to [facility].      Pregnancy Test Needed? No  NSAID Usage: Has the participant taken an OTC pain relief or fever reducing medication or NSAID in the last 30 days? No   The participant was reminded to refrain from NSAID usage until a fever of 102.2 is reached and/or as recommended by a medical professional.     verbalized their understanding. Given this discussion today, their medication list has been updated to reflect today as the stop date of their NSAID.       CBT Device Kit Accountability   Was the [place] Dispensed? Yes  [place] ID: [id number]     Pill 1 Pill 2   Serial Number [id number]   Lot Number 24-12 24-12   Pill Activation Time 10:06 10:06   Were the above pills activated and dispensed? Yes  Only 2 pills were activated and dispensed at this visit given the prevailing 3 pill plan as outlined in section 6.2 (pg. 73) of the MOP.     Pills were activated by the Device Manager and verified by MARK.       CBT Pill Ingestion Log     Pill 1:  Was Pill 1 ingested? Yes  Time of Ingestion: 14:59      Ingestion of Pill 1 was witnessed by Kathe Duke PA-C. Please see their documentation for further details.     Adverse Events Summary:*   Were any Adverse Events (AEs) experienced?  No    Protocol Deviation Summary:*   Were there any Protocol Deviations?:  No    *If Yes, please complete corresponding form.    Concomitant Medications Summary:    Has the subject taken any medications within 30 days prior to signing the informed consent and/or during the study? (Have they started any new medications since their last visit?) Yes, and their medications have not changed since their last visit. Please refer to that documentation for the medication list.       062-MAY-2025  Clifton Higgins

## 2025-05-06 NOTE — PROGRESS NOTES
[place] Pill Witness Study Note  Study Description: The purpose of this study is to explore potential relationships between physiologic parameters collected from sensor data with physiological changes potentially induced by the administration of the COVID-19 vaccine.       I supervised while Rosey Davalos ingested the smart sensor. Smart sensor was swallowed without complication. Participant felt well after and was able to proceed with study procedures.       06-MAY-2025     Kathe Duke PA-C

## 2025-05-08 ENCOUNTER — VIRTUAL VISIT (OUTPATIENT)
Dept: RESEARCH | Facility: CLINIC | Age: 60
End: 2025-05-08
Payer: COMMERCIAL

## 2025-05-08 DIAGNOSIS — Z00.6 RESEARCH SUBJECT: Primary | ICD-10-CM

## 2025-05-08 NOTE — PROGRESS NOTES
[place] Pre-Vaccine Call / Virtual Visit 2 Study Note    Study Description: The purpose of this study is to explore potential relationships between physiologic parameters collected from sensor data with physiological changes potentially induced by the administration of the COVID-19 vaccine.       Subject ID:      Was the visit performed? Yes  Location:     Virtual Visit 2: 08-MAY-2025  Time of Visit (24H): 10:03    Date/Time of Onsite Visit 2 / Vaccine Appointment Confirmed? Yes    Reminders  [x] Check compliance and address any issues   -If there are pending [place] uploads, bring gateway close to the phone  [x] Are you having any issues with your study devices? No  [x] Continue to follow nightly/daily study procedures   [x] Are you sick today or experiencing any cold/flu-like symptoms today? No     -If yes, investigate if rescheduling vaccine appointment is required  [x] Please remember to ingest Pill #2 tonight/the evening before your vaccine appointment    -As long as Pill #2 has not turned and stayed red in the [place] kamlesh   [x] Please bring your Study Watch, Study Phone, Subject Instructions AND GATEWAY to your appointment tomorrow.       Additional Notes: N/A    Adverse Events Summary:*   Were any Adverse Events (AEs) experienced?  No new AE at this visit. The participant has a previous AE that has already been reported and documented.     Protocol Deviation Summary:*   Were there any Protocol Deviations?:  No    *If Yes, please complete corresponding form.    Concomitant Medications Summary:    Has the subject taken any medications within 30 days prior to signing the informed consent and/or during the study? (Have they started any new medications since their last appointment?)   Yes, and their medications have not changed since their last visit. Please refer to that documentation for the medication list.       05-MAY-2025     Clifton Higgins

## 2025-05-09 ENCOUNTER — ALLIED HEALTH/NURSE VISIT (OUTPATIENT)
Dept: RESEARCH | Facility: CLINIC | Age: 60
End: 2025-05-09
Payer: COMMERCIAL

## 2025-05-09 DIAGNOSIS — Z00.6 EXAMINATION OF PARTICIPANT OR CONTROL IN CLINICAL RESEARCH: Primary | ICD-10-CM

## 2025-05-09 DIAGNOSIS — Z23 HIGH PRIORITY FOR 2019-NCOV VACCINE: ICD-10-CM

## 2025-05-09 PROCEDURE — 91320 SARSCV2 VAC 30MCG TRS-SUC IM: CPT

## 2025-05-09 PROCEDURE — 90480 ADMN SARSCOV2 VAC 1/ONLY CMP: CPT

## 2025-05-09 NOTE — PROGRESS NOTES
[place] Pill Witness Study Note  Study Description: The purpose of this study is to explore potential relationships between physiologic parameters collected from sensor data with physiological changes potentially induced by the administration of the COVID-19 vaccine.       I supervised while Rosey Davalos ingested the smart sensor. Smart sensor was swallowed without complication. Participant felt well after and was able proceeded with study procedures.       09-MAY-2025     Sandra Grey RN

## 2025-05-09 NOTE — PROGRESS NOTES
[place] Vaccine Day /On-Site Visit 2 Note  Study Description: The purpose of this study is to explore potential relationships between physiologic parameters collected from sensor data with physiological changes potentially induced by the administration of the COVID-19 vaccine.    Subject ID: [id number]     Rosey Davalos presents to [facility] for On-Site 2 on 09-MAY-2025. All procedures below occurred on this day.      On-Site Visit 2                                                                Was the visit performed?: Yes   Did the participant miss any morning or evening surveys since their pill day? No  If yes, they need to complete makeup surveys    CBT Ingestion Log      Pill 2   Was the Smart Pill Ingested? Yes   Date of Ingestion 08-MAY-2025   Time of Ingestion 18:25   *If Pill 2 ingestion information was collected during Virtual Visit 2, please delete the Pill 2 column.       CBT Device Kit Accountability     Was a [employer] Dispensed?: Yes, [id number]      Pill 3   Serial Number [id number]   Lot Number 24-12   Pill Activation Time 15:14   Were the above pills activated and dispensed? Yes  Only 1 additional pill was activated and dispensed at this visit given the prevailing 3 pill plan as outlined in section 6.2 (pg. 73) of the MOP.     Smartpill was activated by the Device Manager and verified by KATI.     CBT Pill Ingestion Log     Pill 3:  Was Pill 3 ingested? Yes  Time of Ingestion: 15:25     Please review the provider note for vaccine eligibility screening and administration information.     Adverse Events Summary:*   Were any Adverse Events (AEs) experienced?  No    Protocol Deviation Summary:*   Were there any Protocol Deviations?:  No    *If Yes, please complete corresponding form.    Concomitant Medications Summary:    Has the subject taken any medications within 30 days prior to signing the informed consent and/or during the study? (Have they started any new medications since their  last visit?) Yes, and their medications have not changed since their last visit. Please refer to that documentation for the medication list.       09-MAY-2025   Sandra Grey RN

## 2025-05-09 NOTE — PATIENT INSTRUCTIONS
[place] Vaccine Screening & Administration Note:  Study Description: The purpose of this study is to explore potential relationships between physiologic parameters collected from sensor data with physiological changes potentially induced by the administration of the COVID-19 vaccine.    Subject ID:   I saw Rosey Davalos today to review their eligibility for Covid-19 vaccination and administer the vaccine when appropriate. Vaccine information sheet (VIS) was provided to the participant.     COVID Vaccine Screening   Are you sick today or experiencing any cold/flu-like symptoms?   No  Have you received a dose of the Covid-19 vaccine before?   Yes,   Date of most recent Covid-19 vaccine dose:     15-December-2021 Moderna  Do you currently have a health condition or are undergoing    treatment that makes you moderately to severely immunocompromised?* No  Have you ever had an allergic reaction to a Covid vaccine?**  No  Have you ever had an allergic reaction to another vaccine or injectable  medication?         No  Have you ever felt dizzy or faint before, during or after a shot?   No    *Ex: treatment of cancer, HIV, organ transplant recipient, immunosuppressive therapy, etc.     **This would include a severe allergic reaction (e.g., anaphylaxis that required treatment with epinephrine or caused you to go to the hospital. It would also include an allergic reaction that caused hives, swelling, or respiratory distress, including wheezing)    Vaccine Administration      Was a vaccine administered?  Yes  5 Rights of Dosing Confirmed?  Yes   -The Right patient?   -The Right drug?   -The Right time?    -The Right dose?   -The Right route?    Vaccine Administration Information  Administration Date Administration Time Initials of  Initials of Vaccine Verifier    05/09/25  15:11 DAVE PARIKH     Covid-19 Vaccine Information    Lot Number Box Number Expiration Route Arm    Pfizer [id number]   [id number] 11-October-2025 Intramuscular Right   Updated vaccination card and AVS were provided to the participant.    I examined the participant 15 minutes after administration. There were no complications like shortness of breath, throat/chest tightness, sore throat, wheezing or generalized itching. Injection site looks fine without redness, induration or swelling. Participant is able to proceed with study procedures.        09-MAY-2025   Margarita Kiran NP

## 2025-05-13 ENCOUNTER — VIRTUAL VISIT (OUTPATIENT)
Dept: RESEARCH | Facility: CLINIC | Age: 60
End: 2025-05-13
Payer: COMMERCIAL

## 2025-05-13 DIAGNOSIS — Z00.6 RESEARCH SUBJECT: Primary | ICD-10-CM

## 2025-05-13 PROCEDURE — 99207 PR NO CHARGE NURSE ONLY: CPT | Mod: 93

## 2025-05-13 NOTE — PROGRESS NOTES
[facility] Call / Virtual Visit 3 Note    Study Description: The purpose of this study is to explore potential relationships between physiologic parameters collected from sensor data with physiological changes potentially induced by the administration of the COVID-19 vaccine.       Subject ID:      Was the visit performed? Yes  Location:     Virtual Visit 3: 13-MAY-2025  Time of Visit (24H): 09:20      Reminders  [x] Check compliance and address any issues   -If there are pending [employer] uploads, bring gateway close to the phone  [x] Are you having any issues with your study devices? No   [x] Continue to follow nightly/daily study procedures  [x] Are you sick today or experiencing any cold/flu-like symptoms? No  [x] Please remember to ingest Pill #5 (and #6) according to the schedule described at your last appointment      Additional Notes: 2 green, 1 red.       Adverse Events Summary:*   Were any Adverse Events (AEs) experienced?  No    Protocol Deviation Summary:*   Were there any Protocol Deviations?:  No    *If Yes, please complete corresponding form.    Concomitant Medications Summary:    Has the subject taken any medications within 30 days prior to signing the informed consent and/or during the study? (Have they started any new medications since their last appointment?) Yes, and their medications have not changed since their last visit. Please refer to that documentation for the medication list.       13`-MAY-2025   Sandra Grey RN

## 2025-06-03 ENCOUNTER — ALLIED HEALTH/NURSE VISIT (OUTPATIENT)
Dept: RESEARCH | Facility: CLINIC | Age: 60
End: 2025-06-03
Payer: COMMERCIAL

## 2025-06-03 DIAGNOSIS — Z00.6 RESEARCH SUBJECT: Primary | ICD-10-CM

## 2025-06-03 PROCEDURE — 99207 PR NO CHARGE NURSE ONLY: CPT

## 2025-06-03 NOTE — PROGRESS NOTES
[facility] End of Study Note  -Including Device Accountability Returned and Subject Disposition    Study Description: The purpose of this study is to explore potential relationships between physiologic parameters collected from sensor data with physiological changes potentially induced by the administration of the COVID-19 vaccine.       Subject ID:        Was the visit performed?  Yes   Was Study Exit Survey Completed on the study phone?: Yes    Subject Disposition:  Did the subject complete the study? Yes   If no, Why? N/A    Which Visit did the participant exit from the study? End of Study Visit  Study Completion Date: 3-SHIRA-2025      [facility] Device Accountability Returned Form    Was the Device Kit Returned? Yes   Date Device Kit Returned:  3-SHIRA-2025   Were There Device Issues?  No   Was the Phone Returned?   Returned Phone ID: Yes  [id number]   Was the Watch Returned?   Returned Watch ID: Yes  [id number]   Was the [employer] [place] Returned?   Returned [employer] [place] ID: Yes  [id number]   Were All Chargers and Accessories Returned?  Yes        Adverse Events Summary:*   Were any Adverse Events (AEs) experienced? No new AE at this visit. The participant has a previous AE that has already been reported and documented.     Protocol Deviation Summary:*   Were there any Protocol Deviations?:  No    *If Yes, please complete corresponding form.    Concomitant Medications Summary:    Has the subject taken any medications within 30 days prior to signing the informed consent and/or during the study? (Have they started any new medications?) Yes, and their medications have not changed since their last visit. Please refer to that documentation for the medication list.     3-SHIRA-2025   ROCIO Barajas

## 2025-07-13 ENCOUNTER — HEALTH MAINTENANCE LETTER (OUTPATIENT)
Age: 60
End: 2025-07-13

## (undated) RX ORDER — LIDOCAINE HYDROCHLORIDE 10 MG/ML
INJECTION, SOLUTION INFILTRATION; PERINEURAL
Status: DISPENSED
Start: 2017-08-30

## (undated) RX ORDER — TRIAMCINOLONE ACETONIDE 40 MG/ML
INJECTION, SUSPENSION INTRA-ARTICULAR; INTRAMUSCULAR
Status: DISPENSED
Start: 2017-08-30

## (undated) RX ORDER — DEXAMETHASONE SODIUM PHOSPHATE 4 MG/ML
INJECTION, SOLUTION INTRA-ARTICULAR; INTRALESIONAL; INTRAMUSCULAR; INTRAVENOUS; SOFT TISSUE
Status: DISPENSED
Start: 2017-08-30